# Patient Record
Sex: FEMALE | Race: WHITE | Employment: FULL TIME | URBAN - METROPOLITAN AREA
[De-identification: names, ages, dates, MRNs, and addresses within clinical notes are randomized per-mention and may not be internally consistent; named-entity substitution may affect disease eponyms.]

---

## 2018-10-24 ENCOUNTER — HOSPITAL ENCOUNTER (OUTPATIENT)
Dept: LAB | Age: 38
Discharge: HOME | End: 2018-10-24
Payer: COMMERCIAL

## 2018-10-24 DIAGNOSIS — D64.9 ANEMIA, UNSPECIFIED TYPE: ICD-10-CM

## 2018-10-24 PROBLEM — D50.9 IDA (IRON DEFICIENCY ANEMIA): Status: ACTIVE | Noted: 2018-10-24

## 2018-10-24 LAB
BASOPHILS # BLD: 0 K/UL (ref 0–0.2)
BASOPHILS NFR BLD: 1 % (ref 0–2)
DIFFERENTIAL METHOD BLD: ABNORMAL
EOSINOPHIL # BLD: 0.2 K/UL (ref 0–0.8)
EOSINOPHIL NFR BLD: 2 % (ref 0.5–7.8)
ERYTHROCYTE [DISTWIDTH] IN BLOOD BY AUTOMATED COUNT: 18.1 % (ref 11.9–14.6)
FERRITIN SERPL-MCNC: 4 NG/ML (ref 8–388)
HCT VFR BLD AUTO: 35.6 % (ref 35.8–46.3)
HGB BLD-MCNC: 10.8 G/DL (ref 11.7–15.4)
HGB RETIC QN AUTO: 28 PG (ref 29–35)
IMM GRANULOCYTES # BLD: 0 K/UL (ref 0–0.5)
IMM GRANULOCYTES NFR BLD AUTO: 1 % (ref 0–5)
IMM RETICS NFR: 10.4 % (ref 3–15.9)
IRON SATN MFR SERPL: 7 %
IRON SERPL-MCNC: 33 UG/DL (ref 35–150)
LYMPHOCYTES # BLD: 1.9 K/UL (ref 0.5–4.6)
LYMPHOCYTES NFR BLD: 29 % (ref 13–44)
MCH RBC QN AUTO: 23.2 PG (ref 26.1–32.9)
MCHC RBC AUTO-ENTMCNC: 30.3 G/DL (ref 31.4–35)
MCV RBC AUTO: 76.6 FL (ref 79.6–97.8)
MONOCYTES # BLD: 0.4 K/UL (ref 0.1–1.3)
MONOCYTES NFR BLD: 6 % (ref 4–12)
NEUTS SEG # BLD: 4 K/UL (ref 1.7–8.2)
NEUTS SEG NFR BLD: 61 % (ref 43–78)
NRBC # BLD: 0 K/UL (ref 0–0.2)
PLATELET # BLD AUTO: 301 K/UL (ref 150–450)
PMV BLD AUTO: 11 FL (ref 9.4–12.3)
RBC # BLD AUTO: 4.65 M/UL (ref 4.05–5.25)
RETICS # AUTO: 0.04 M/UL (ref 0.03–0.1)
RETICS/RBC NFR AUTO: 0.8 % (ref 0.3–2)
TIBC SERPL-MCNC: 476 UG/DL (ref 250–450)
WBC # BLD AUTO: 6.6 K/UL (ref 4.3–11.1)

## 2018-10-24 PROCEDURE — 36415 COLL VENOUS BLD VENIPUNCTURE: CPT

## 2018-10-24 PROCEDURE — 83540 ASSAY OF IRON: CPT

## 2018-10-24 PROCEDURE — 85046 RETICYTE/HGB CONCENTRATE: CPT

## 2018-10-24 PROCEDURE — 85025 COMPLETE CBC W/AUTO DIFF WBC: CPT

## 2018-10-24 PROCEDURE — 82728 ASSAY OF FERRITIN: CPT

## 2018-11-02 ENCOUNTER — APPOINTMENT (OUTPATIENT)
Dept: INFUSION THERAPY | Age: 38
End: 2018-11-02

## 2018-11-07 ENCOUNTER — DOCUMENTATION ONLY (OUTPATIENT)
Dept: HEMATOLOGY | Age: 38
End: 2018-11-07

## 2018-11-07 ENCOUNTER — HOSPITAL ENCOUNTER (OUTPATIENT)
Dept: INFUSION THERAPY | Age: 38
Discharge: HOME | End: 2018-11-07
Payer: COMMERCIAL

## 2018-11-07 VITALS
OXYGEN SATURATION: 96 % | BODY MASS INDEX: 52.98 KG/M2 | TEMPERATURE: 98.1 F | HEART RATE: 120 BPM | RESPIRATION RATE: 18 BRPM | DIASTOLIC BLOOD PRESSURE: 98 MMHG | SYSTOLIC BLOOD PRESSURE: 157 MMHG | WEIGHT: 293 LBS

## 2018-11-07 DIAGNOSIS — D50.9 IRON DEFICIENCY ANEMIA, UNSPECIFIED IRON DEFICIENCY ANEMIA TYPE: Primary | ICD-10-CM

## 2018-11-07 PROCEDURE — 96365 THER/PROPH/DIAG IV INF INIT: CPT

## 2018-11-07 PROCEDURE — 74011250636 HC RX REV CODE- 250/636: Performed by: INTERNAL MEDICINE

## 2018-11-07 RX ORDER — DIPHENHYDRAMINE HYDROCHLORIDE 50 MG/ML
50 INJECTION, SOLUTION INTRAMUSCULAR; INTRAVENOUS AS NEEDED
Status: ACTIVE | OUTPATIENT
Start: 2018-11-07 | End: 2018-11-08

## 2018-11-07 RX ORDER — SODIUM CHLORIDE 0.9 % (FLUSH) 0.9 %
10 SYRINGE (ML) INJECTION AS NEEDED
Status: ACTIVE | OUTPATIENT
Start: 2018-11-07 | End: 2018-11-08

## 2018-11-07 RX ORDER — ACETAMINOPHEN 325 MG/1
650 TABLET ORAL AS NEEDED
Status: ACTIVE | OUTPATIENT
Start: 2018-11-07 | End: 2018-11-08

## 2018-11-07 RX ADMIN — SODIUM CHLORIDE 500 ML: 900 INJECTION, SOLUTION INTRAVENOUS at 14:10

## 2018-11-07 RX ADMIN — FERRIC CARBOXYMALTOSE INJECTION 750 MG: 50 INJECTION, SOLUTION INTRAVENOUS at 14:24

## 2018-11-07 NOTE — PROGRESS NOTES
Arrived to the [facility]. Injectafer and hydration completed. Patient tolerated without problems. Any issues or concerns during appointment: None. Patient aware of next infusion appointment on 11/14/2018 at 13:15. Discharged ambulatory to home.

## 2018-11-07 NOTE — PROGRESS NOTES
I spoke with Mrs. Justice regarding the [facility] co-pay program with [facility]. I went over the program, enrollment, and benefits of the program.  She agreed to allow me to enroll her on her behalf and signed the LPOA document. Enrollment information and LPOA will be scanned into chart.

## 2018-11-09 ENCOUNTER — APPOINTMENT (OUTPATIENT)
Dept: INFUSION THERAPY | Age: 38
End: 2018-11-09

## 2018-11-14 ENCOUNTER — HOSPITAL ENCOUNTER (OUTPATIENT)
Dept: INFUSION THERAPY | Age: 38
Discharge: HOME | End: 2018-11-14
Payer: COMMERCIAL

## 2018-11-14 VITALS
OXYGEN SATURATION: 99 % | DIASTOLIC BLOOD PRESSURE: 89 MMHG | HEART RATE: 81 BPM | SYSTOLIC BLOOD PRESSURE: 142 MMHG | TEMPERATURE: 98.6 F | BODY MASS INDEX: 53.02 KG/M2 | WEIGHT: 293 LBS | RESPIRATION RATE: 16 BRPM

## 2018-11-14 DIAGNOSIS — D50.9 IRON DEFICIENCY ANEMIA, UNSPECIFIED IRON DEFICIENCY ANEMIA TYPE: Primary | ICD-10-CM

## 2018-11-14 PROCEDURE — 74011250636 HC RX REV CODE- 250/636: Performed by: INTERNAL MEDICINE

## 2018-11-14 PROCEDURE — 96365 THER/PROPH/DIAG IV INF INIT: CPT

## 2018-11-14 RX ORDER — SODIUM CHLORIDE 0.9 % (FLUSH) 0.9 %
10 SYRINGE (ML) INJECTION AS NEEDED
Status: ACTIVE | OUTPATIENT
Start: 2018-11-14 | End: 2018-11-15

## 2018-11-14 RX ADMIN — FERRIC CARBOXYMALTOSE INJECTION 750 MG: 50 INJECTION, SOLUTION INTRAVENOUS at 13:55

## 2018-11-14 RX ADMIN — SODIUM CHLORIDE 500 ML: 900 INJECTION, SOLUTION INTRAVENOUS at 13:50

## 2018-11-14 RX ADMIN — Medication 10 ML: at 14:36

## 2018-11-22 NOTE — PROGRESS NOTES
Arrived to the [facility] license of [facility]. Injectafer completed. Patient tolerated well. Any issues or concerns during appointment: none. No future infusion appointments at this time. Discharged ambulatory.  
 
Crystal Macdonald RN

## 2018-12-27 ENCOUNTER — ANESTHESIA EVENT (OUTPATIENT)
Dept: ENDOSCOPY | Age: 38
End: 2018-12-27
Payer: COMMERCIAL

## 2018-12-28 ENCOUNTER — HOSPITAL ENCOUNTER (OUTPATIENT)
Age: 38
Setting detail: OUTPATIENT SURGERY
Discharge: HOME | End: 2018-12-28
Attending: INTERNAL MEDICINE | Admitting: INTERNAL MEDICINE
Payer: COMMERCIAL

## 2018-12-28 ENCOUNTER — ANESTHESIA (OUTPATIENT)
Dept: ENDOSCOPY | Age: 38
End: 2018-12-28
Payer: COMMERCIAL

## 2018-12-28 VITALS
RESPIRATION RATE: 14 BRPM | HEART RATE: 60 BPM | HEIGHT: 67 IN | SYSTOLIC BLOOD PRESSURE: 129 MMHG | WEIGHT: 293 LBS | BODY MASS INDEX: 45.99 KG/M2 | DIASTOLIC BLOOD PRESSURE: 63 MMHG | TEMPERATURE: 97 F | OXYGEN SATURATION: 100 %

## 2018-12-28 PROCEDURE — 77030009426 HC FCPS BIOP ENDOSC BSC -B: Performed by: INTERNAL MEDICINE

## 2018-12-28 PROCEDURE — 88305 TISSUE EXAM BY PATHOLOGIST: CPT

## 2018-12-28 PROCEDURE — 74011250636 HC RX REV CODE- 250/636: Performed by: ANESTHESIOLOGY

## 2018-12-28 PROCEDURE — 76060000032 HC ANESTHESIA 0.5 TO 1 HR: Performed by: INTERNAL MEDICINE

## 2018-12-28 PROCEDURE — 76040000025: Performed by: INTERNAL MEDICINE

## 2018-12-28 PROCEDURE — 77030013991 HC SNR POLYP ENDOSC BSC -A: Performed by: INTERNAL MEDICINE

## 2018-12-28 PROCEDURE — 74011250636 HC RX REV CODE- 250/636

## 2018-12-28 RX ORDER — SODIUM CHLORIDE, SODIUM LACTATE, POTASSIUM CHLORIDE, CALCIUM CHLORIDE 600; 310; 30; 20 MG/100ML; MG/100ML; MG/100ML; MG/100ML
100 INJECTION, SOLUTION INTRAVENOUS CONTINUOUS
Status: DISCONTINUED | OUTPATIENT
Start: 2018-12-28 | End: 2018-12-28 | Stop reason: HOSPADM

## 2018-12-28 RX ORDER — PROPOFOL 10 MG/ML
INJECTION, EMULSION INTRAVENOUS AS NEEDED
Status: DISCONTINUED | OUTPATIENT
Start: 2018-12-28 | End: 2018-12-28 | Stop reason: HOSPADM

## 2018-12-28 RX ORDER — LIDOCAINE HYDROCHLORIDE 20 MG/ML
INJECTION, SOLUTION EPIDURAL; INFILTRATION; INTRACAUDAL; PERINEURAL AS NEEDED
Status: DISCONTINUED | OUTPATIENT
Start: 2018-12-28 | End: 2018-12-28 | Stop reason: HOSPADM

## 2018-12-28 RX ORDER — PROPOFOL 10 MG/ML
INJECTION, EMULSION INTRAVENOUS
Status: DISCONTINUED | OUTPATIENT
Start: 2018-12-28 | End: 2018-12-28 | Stop reason: HOSPADM

## 2018-12-28 RX ADMIN — PROPOFOL 160 MCG/KG/MIN: 10 INJECTION, EMULSION INTRAVENOUS at 13:44

## 2018-12-28 RX ADMIN — PROPOFOL 30 MG: 10 INJECTION, EMULSION INTRAVENOUS at 13:46

## 2018-12-28 RX ADMIN — PROPOFOL 40 MG: 10 INJECTION, EMULSION INTRAVENOUS at 13:48

## 2018-12-28 RX ADMIN — PROPOFOL 50 MG: 10 INJECTION, EMULSION INTRAVENOUS at 13:44

## 2018-12-28 RX ADMIN — LIDOCAINE HYDROCHLORIDE 60 MG: 20 INJECTION, SOLUTION EPIDURAL; INFILTRATION; INTRACAUDAL; PERINEURAL at 13:42

## 2018-12-28 RX ADMIN — PROPOFOL 30 MG: 10 INJECTION, EMULSION INTRAVENOUS at 13:50

## 2018-12-28 RX ADMIN — SODIUM CHLORIDE, SODIUM LACTATE, POTASSIUM CHLORIDE, AND CALCIUM CHLORIDE 100 ML/HR: 600; 310; 30; 20 INJECTION, SOLUTION INTRAVENOUS at 13:33

## 2018-12-28 NOTE — PROCEDURES
Colonoscopy Procedure Note    Indications: BRENDON, PMH polyps, [facility] polyps/ CRC    Anesthesia/Sedation: MAC IV     Pre-Procedure Physical:  Current Facility-Administered Medications   Medication Dose Route Frequency    lactated Ringers infusion  100 mL/hr IntraVENous CONTINUOUS     Facility-Administered Medications Ordered in Other Encounters   Medication Dose Route Frequency    propofol (DIPRIVAN) 10 mg/mL injection    CONTINUOUS    propofol (DIPRIVAN) 10 mg/mL injection    PRN    lidocaine (PF) (XYLOCAINE) 20 mg/mL (2 %) injection   IntraVENous PRN      Latex; Whey; Nsaids (non-steroidal anti-inflammatory drug); Soy; and Zosyn [piperacillin-tazobactam]    Patient Vitals for the past 8 hrs:   BP Temp Pulse Resp SpO2 Height Weight   12/28/18 1244 156/85  66 18 100 %     12/28/18 1226  99 °F (37.2 °C)    5' 6.5\" (1.689 m) 141.5 kg (312 lb)       Exam:    Airway: clear   Heart: normal S1and S2    Lungs: clear bilateral  Abdomen: soft, nontender, bowel sounds present and normal in all quads   Mental Status: awake, alert and oriented to person, place and time        Procedure Details      Informed consent was obtained for the procedure, including sedation. Risks of perforation, hemorrhage, adverse drug reaction and aspiration were discussed. The patient was placed in the left lateral decubitus position. Based on the pre-procedure assessment, including review of the patient's medical history, medications, allergies, and review of systems, she had been deemed to be an appropriate candidate for conscious sedation; she was therefore sedated with the medications listed below. The patient was monitored continuously with ECG tracing, pulse oximetry, blood pressure monitoring, and direct observations. A rectal examination was performed. The colonoscope was inserted into the rectum and advanced under direct vision to the cecum. The quality of the colonic preparation was good.  A careful inspection was made as the colonoscope was withdrawn, including a retroflexed view of the rectum; findings and interventions are described below. Appropriate photodocumentation was obtained. Findings:   Single 9 mm sessile polyp removed by hot snare from the transverse colon. Single 7 mm sessile polyp removed by cold snare from the sigmoid colon. Specimens: Polyps    Estimated Blood Loss: 0 cc           Complications: None; patient tolerated the procedure well. Attending Attestation: I performed the procedure. Impression:    Single 9 mm sessile polyp removed by hot snare from the transverse colon. Single 7 mm sessile polyp removed by cold snare from the sigmoid colon. Recommendations:   Repeat colonoscopy in 2 years.

## 2018-12-28 NOTE — PROCEDURES
Endoscopic Gastroduodenoscopy Procedure Note    Indications: BRENDON, gastric bypass    Anesthesia/Sedation: MAC IV     Pre-Procedure Physical:    Current Facility-Administered Medications   Medication Dose Route Frequency    lactated Ringers infusion  100 mL/hr IntraVENous CONTINUOUS      Latex; Whey; Nsaids (non-steroidal anti-inflammatory drug); Soy; and Zosyn [piperacillin-tazobactam]    Patient Vitals for the past 8 hrs:   BP Temp Pulse Resp SpO2 Height Weight   12/28/18 1244 156/85  66 18 100 %     12/28/18 1226  99 °F (37.2 °C)    5' 6.5\" (1.689 m) 141.5 kg (312 lb)       Exam      Airway: clear   Heart: normal S1and S2    Lungs: clear bilateral  Abdomen: soft, nontender, bowel sounds present and normal in all quads   Mental Status: awake, alert and oriented to person, place and time          Procedure Details     Informed consent was obtained for the procedure, including conscious sedation. Risks of pancreatitis, infection, perforation, hemorrhage, adverse drug reaction and aspiration were discussed. The patient was placed in the left lateral decubitus position. Based on the pre-procedure assessment, including review of the patient's medical history, medications, allergies, and review of systems, she had been deemed to be an appropriate candidate for conscious sedation; she was therefore sedated with the medications listed below. She was monitored continuously with ECG tracing, pulse oximetry, blood pressure monitoring, and direct observation. The EGD gastroscope was inserted into the mouth and advanced under direct vision to the second portion of the duodenum. A careful inspection was made as the gastroscope was withdrawn, including a retroflexed view of the proximal stomach; findings and interventions are described below. Appropriate photodocumentation was obtained. Findings:   Esophagus- Normal.  Stomach- Gastric bypass. Jejunum- Biopsied.     Therapies: Biopsy    Specimens: Duodenal    Estimated Blood Loss: 0 cc           Complications:   None; patient tolerated the procedure well. Attending Attestation:  I performed the procedure. Impression:    Gastric bypass. Random duodenal biopsies taken. Recommendations:  Await path results.

## 2018-12-28 NOTE — ANESTHESIA PREPROCEDURE EVALUATION
Anesthetic History PONV Review of Systems / Medical History Patient summary reviewed and pertinent labs reviewed Pulmonary Within defined limits Neuro/Psych Within defined limits Cardiovascular Within defined limits Exercise tolerance: >4 METS 
  
GI/Hepatic/Renal 
Within defined limits Endo/Other Morbid obesity and anemia Other Findings Physical Exam 
 
Airway Mallampati: II 
TM Distance: 4 - 6 cm Neck ROM: normal range of motion Mouth opening: Normal 
 
 Cardiovascular Regular rate and rhythm,  S1 and S2 normal,  no murmur, click, rub, or gallop Dental 
 
 
  
Pulmonary Breath sounds clear to auscultation Abdominal 
 
 
 
 Other Findings Anesthetic Plan ASA: 3 Anesthesia type: total IV anesthesia Induction: Intravenous Anesthetic plan and risks discussed with: Patient

## 2018-12-28 NOTE — H&P
[facility] Consult Note Referring Physician:  
 
Consult Date: 12/28/2018 Reason for Consult: BRENDON, gastric bypass, PMH polyps, [facility] polyps/ CRC History of Present Illness:  Patient is a 34 y.o. female who is seen in consultation for BRENDON, gastric bypass, PMH polyps, [facility] polyps/ CRC. Past Medical History:  
Diagnosis Date  Anemia  Endometriosis  Nausea & vomiting Past Surgical History:  
Procedure Laterality Date  HX CHOLECYSTECTOMY  2006  HX GYN    
 HX TONSILLECTOMY  2000 Family History Problem Relation Age of Onset  Hypertension Mother  Hypertension Father  Cancer Maternal Aunt Social History Occupational History  Not on file Tobacco Use  Smoking status: Never Smoker  Smokeless tobacco: Never Used Substance and Sexual Activity  Alcohol use: No  
  Frequency: Never  Drug use: No  
 Sexual activity: Not on file Hospital Medications: No current facility-administered medications for this encounter. Allergies: Allergies Allergen Reactions  Latex Contact Dermatitis  Whey Shortness of Breath  Nsaids (Non-Steroidal Anti-Inflammatory Drug) Other (comments) Weight loss surgery  Soy Rash  Zosyn [Piperacillin-Tazobactam] Rash Review of Systems: A comprehensive review of systems was negative except for that written in the History of Present Illness. Objective:  
 
Physical Exam: 
Vitals: 
Visit Vitals /85 Pulse 66 Temp 99 °F (37.2 °C) Resp 18 Ht 5' 6.5\" (1.689 m) Wt 141.5 kg (312 lb) SpO2 100% BMI 49.60 kg/m² General: No acute distress. Skin:  Extremities and face reveal no rashes. No hurley erythema. No telangiectasias on the chest wall. HEENT: Sclerae anicteric. No oral ulcers. No abnormal pigmentation of the lips. The neck is supple. Cardiovascular: Regular rate and rhythm. No murmurs, gallops, or rubs. Respiratory:  Comfortable breathing  With no accessory muscle use. Clear breath sounds with no wheezes, rales, or rhonchi. GI:  Abdomen nondistended, soft, and nontender. Normal active bowel sounds. No enlargement of the liver or spleen. No masses palpable. Musculoskeletal:  No pitting edema of the lower legs. Extremities have good range of motion. Neurological:  Gross memory appears intact. Patient is alert and oriented. Psychiatric:  Mood appears appropriate with judgement intact. Lymphatic:  No cervical or supraclavicular adenopathy. Laboratory: No results for input(s): WBC, RBC, HGB, HCT, PLT, HGBEXT, HCTEXT, PLTEXT in the last 72 hours. No results for input(s): GLU, NA, K, CL, CO2, BUN, CREA, CA in the last 72 hours. No results for input(s): PTP, INR, APTT in the last 72 hours. No lab exists for component: INREXT No results for input(s): TBIL, CBIL, SGOT, GPT, AP, ALB, TP, AML, LPSE in the last 72 hours. Assessment: A 34 y.o. female with BRENDON, gastric bypass, [facility] polyps, [facility] polyps/ CRC Plan: 
  
 
EGD and colonoscopy Signed By: Lisa Damon MD   
 December 28, 2018

## 2018-12-28 NOTE — ANESTHESIA POSTPROCEDURE EVALUATION
Procedure(s): ESOPHAGOGASTRODUODENOSCOPY (EGD) COLONOSCOPY/ 51 
ESOPHAGOGASTRODUODENAL (EGD) BIOPSY ENDOSCOPIC POLYPECTOMY. Anesthesia Post Evaluation Multimodal analgesia: multimodal analgesia not used between 6 hours prior to anesthesia start to PACU discharge Patient location during evaluation: bedside Patient participation: complete - patient participated Level of consciousness: awake and alert Pain score: 3 Pain management: adequate Airway patency: patent Anesthetic complications: no 
Cardiovascular status: acceptable and hemodynamically stable Respiratory status: acceptable Hydration status: acceptable Visit Vitals /63 Pulse 60 Temp 36.1 °C (97 °F) Resp 14 Ht 5' 6.5\" (1.689 m) Wt 141.5 kg (312 lb) SpO2 100% BMI 49.60 kg/m²

## 2018-12-28 NOTE — DISCHARGE INSTRUCTIONS
Gastrointestinal Esophagogastroduodenoscopy (EGD) - Upper Exam Discharge Instructions    1. Call Dr. Rosie Lira at [phone] for any problems or questions. 2. Contact the doctor's office for follow up appointment as directed. 3. Medication may cause drowsiness for several hours, therefore, do not drive or operate machinery for remainder of the day. 4. No alcohol today. 5. Ordinarily, you may resume regular diet and activity after exam unless otherwise specified by your physician. 6. For mild soreness in your throat you may use Cepacol throat lozenges or warm  salt-water gargles as needed. Gastrointestinal Colonoscopy/Flexible Sigmoidoscopy - Lower Exam Discharge Instructions  1. Because of air put into your colon during exam, you may experience some abdominal distension, relieved by the passage of gas, for several hours. 2. Contact your physician if you have any of the following:  a. Excessive amount of bleeding - large amount when having a bowel movement. Occasional specks of blood with bowel movement would not be unusual.  b. Severe abdominal pain  c.  Fever or Chills    Any additional instructions:  Repeat colonoscopy in 2 years

## 2018-12-28 NOTE — ROUTINE PROCESS
VSS. No complaints noted. Education reviewed and signed with patient's . Pt discharged via wheelchair [facility].

## 2019-02-15 ENCOUNTER — HOSPITAL ENCOUNTER (OUTPATIENT)
Dept: LAB | Age: 39
Discharge: HOME | End: 2019-02-15
Payer: COMMERCIAL

## 2019-02-15 DIAGNOSIS — D50.9 IRON DEFICIENCY ANEMIA, UNSPECIFIED IRON DEFICIENCY ANEMIA TYPE: ICD-10-CM

## 2019-02-15 LAB
ALBUMIN SERPL-MCNC: 3.9 G/DL (ref 3.5–5)
ALBUMIN/GLOB SERPL: 1.1 {RATIO} (ref 1.2–3.5)
ALP SERPL-CCNC: 124 U/L (ref 50–136)
ALT SERPL-CCNC: 46 U/L (ref 12–65)
ANION GAP SERPL CALC-SCNC: 4 MMOL/L (ref 7–16)
AST SERPL-CCNC: 34 U/L (ref 15–37)
BASOPHILS # BLD: 0 K/UL (ref 0–0.2)
BASOPHILS NFR BLD: 1 % (ref 0–2)
BILIRUB SERPL-MCNC: 0.3 MG/DL (ref 0.2–1.1)
BUN SERPL-MCNC: 9 MG/DL (ref 6–23)
CALCIUM SERPL-MCNC: 8.6 MG/DL (ref 8.3–10.4)
CHLORIDE SERPL-SCNC: 110 MMOL/L (ref 98–107)
CO2 SERPL-SCNC: 26 MMOL/L (ref 21–32)
CREAT SERPL-MCNC: 0.78 MG/DL (ref 0.6–1)
DIFFERENTIAL METHOD BLD: ABNORMAL
EOSINOPHIL # BLD: 0.1 K/UL (ref 0–0.8)
EOSINOPHIL NFR BLD: 3 % (ref 0.5–7.8)
ERYTHROCYTE [DISTWIDTH] IN BLOOD BY AUTOMATED COUNT: 15.5 % (ref 11.9–14.6)
FERRITIN SERPL-MCNC: 39 NG/ML (ref 8–388)
GLOBULIN SER CALC-MCNC: 3.6 G/DL (ref 2.3–3.5)
GLUCOSE SERPL-MCNC: 80 MG/DL (ref 65–100)
HCT VFR BLD AUTO: 39.7 % (ref 35.8–46.3)
HGB BLD-MCNC: 13.1 G/DL (ref 11.7–15.4)
IMM GRANULOCYTES # BLD AUTO: 0 K/UL (ref 0–0.5)
IMM GRANULOCYTES NFR BLD AUTO: 0 % (ref 0–5)
IRON SATN MFR SERPL: 23 %
IRON SERPL-MCNC: 75 UG/DL (ref 35–150)
LYMPHOCYTES # BLD: 1.2 K/UL (ref 0.5–4.6)
LYMPHOCYTES NFR BLD: 22 % (ref 13–44)
MCH RBC QN AUTO: 29.4 PG (ref 26.1–32.9)
MCHC RBC AUTO-ENTMCNC: 33 G/DL (ref 31.4–35)
MCV RBC AUTO: 89 FL (ref 79.6–97.8)
MONOCYTES # BLD: 0.5 K/UL (ref 0.1–1.3)
MONOCYTES NFR BLD: 9 % (ref 4–12)
NEUTS SEG # BLD: 3.5 K/UL (ref 1.7–8.2)
NEUTS SEG NFR BLD: 66 % (ref 43–78)
NRBC # BLD: 0 K/UL (ref 0–0.2)
PLATELET # BLD AUTO: 176 K/UL (ref 150–450)
PMV BLD AUTO: 11 FL (ref 9.4–12.3)
POTASSIUM SERPL-SCNC: 3.9 MMOL/L (ref 3.5–5.1)
PROT SERPL-MCNC: 7.5 G/DL (ref 6.3–8.2)
RBC # BLD AUTO: 4.46 M/UL (ref 4.05–5.25)
SODIUM SERPL-SCNC: 140 MMOL/L (ref 136–145)
TIBC SERPL-MCNC: 327 UG/DL (ref 250–450)
WBC # BLD AUTO: 5.3 K/UL (ref 4.3–11.1)

## 2019-02-15 PROCEDURE — 83540 ASSAY OF IRON: CPT

## 2019-02-15 PROCEDURE — 80053 COMPREHEN METABOLIC PANEL: CPT

## 2019-02-15 PROCEDURE — 82728 ASSAY OF FERRITIN: CPT

## 2019-02-15 PROCEDURE — 85025 COMPLETE CBC W/AUTO DIFF WBC: CPT

## 2019-02-15 PROCEDURE — 36415 COLL VENOUS BLD VENIPUNCTURE: CPT

## 2021-11-17 ENCOUNTER — ANESTHESIA EVENT (OUTPATIENT)
Dept: ENDOSCOPY | Age: 41
End: 2021-11-17
Payer: COMMERCIAL

## 2021-11-17 RX ORDER — SODIUM CHLORIDE, SODIUM LACTATE, POTASSIUM CHLORIDE, CALCIUM CHLORIDE 600; 310; 30; 20 MG/100ML; MG/100ML; MG/100ML; MG/100ML
100 INJECTION, SOLUTION INTRAVENOUS CONTINUOUS
Status: CANCELLED | OUTPATIENT
Start: 2021-11-17

## 2021-11-17 RX ORDER — HYDROMORPHONE HYDROCHLORIDE 1 MG/ML
0.5 INJECTION, SOLUTION INTRAMUSCULAR; INTRAVENOUS; SUBCUTANEOUS
Status: CANCELLED | OUTPATIENT
Start: 2021-11-17

## 2021-11-17 RX ORDER — ONDANSETRON 2 MG/ML
4 INJECTION INTRAMUSCULAR; INTRAVENOUS ONCE
Status: CANCELLED | OUTPATIENT
Start: 2021-11-17 | End: 2021-11-17

## 2021-11-17 RX ORDER — OXYCODONE HYDROCHLORIDE 5 MG/1
5 TABLET ORAL
Status: CANCELLED | OUTPATIENT
Start: 2021-11-17 | End: 2021-11-18

## 2021-11-17 RX ORDER — OXYCODONE HYDROCHLORIDE 5 MG/1
10 TABLET ORAL
Status: CANCELLED | OUTPATIENT
Start: 2021-11-17 | End: 2021-11-18

## 2021-11-17 RX ORDER — DIPHENHYDRAMINE HYDROCHLORIDE 50 MG/ML
12.5 INJECTION, SOLUTION INTRAMUSCULAR; INTRAVENOUS
Status: CANCELLED | OUTPATIENT
Start: 2021-11-17 | End: 2021-11-18

## 2021-11-17 RX ORDER — ALBUTEROL SULFATE 0.83 MG/ML
2.5 SOLUTION RESPIRATORY (INHALATION) AS NEEDED
Status: CANCELLED | OUTPATIENT
Start: 2021-11-17

## 2021-11-17 RX ORDER — NALOXONE HYDROCHLORIDE 0.4 MG/ML
0.1 INJECTION, SOLUTION INTRAMUSCULAR; INTRAVENOUS; SUBCUTANEOUS AS NEEDED
Status: CANCELLED | OUTPATIENT
Start: 2021-11-17

## 2021-11-19 ENCOUNTER — HOSPITAL ENCOUNTER (OUTPATIENT)
Age: 41
Setting detail: OUTPATIENT SURGERY
Discharge: HOME | End: 2021-11-19
Attending: INTERNAL MEDICINE | Admitting: INTERNAL MEDICINE
Payer: COMMERCIAL

## 2021-11-19 ENCOUNTER — ANESTHESIA (OUTPATIENT)
Dept: ENDOSCOPY | Age: 41
End: 2021-11-19
Payer: COMMERCIAL

## 2021-11-19 VITALS
HEART RATE: 58 BPM | OXYGEN SATURATION: 100 % | TEMPERATURE: 97.7 F | RESPIRATION RATE: 16 BRPM | SYSTOLIC BLOOD PRESSURE: 150 MMHG | DIASTOLIC BLOOD PRESSURE: 67 MMHG

## 2021-11-19 LAB — HCG UR QL: NEGATIVE

## 2021-11-19 PROCEDURE — 88312 SPECIAL STAINS GROUP 1: CPT

## 2021-11-19 PROCEDURE — 76060000032 HC ANESTHESIA 0.5 TO 1 HR: Performed by: INTERNAL MEDICINE

## 2021-11-19 PROCEDURE — 76040000025: Performed by: INTERNAL MEDICINE

## 2021-11-19 PROCEDURE — 2709999900 HC NON-CHARGEABLE SUPPLY: Performed by: INTERNAL MEDICINE

## 2021-11-19 PROCEDURE — 74011250636 HC RX REV CODE- 250/636: Performed by: ANESTHESIOLOGY

## 2021-11-19 PROCEDURE — 74011250636 HC RX REV CODE- 250/636: Performed by: REGISTERED NURSE

## 2021-11-19 PROCEDURE — 74011000250 HC RX REV CODE- 250: Performed by: REGISTERED NURSE

## 2021-11-19 PROCEDURE — 88305 TISSUE EXAM BY PATHOLOGIST: CPT

## 2021-11-19 PROCEDURE — 77030021593 HC FCPS BIOP ENDOSC BSC -A: Performed by: INTERNAL MEDICINE

## 2021-11-19 PROCEDURE — 81025 URINE PREGNANCY TEST: CPT

## 2021-11-19 RX ORDER — PROPOFOL 10 MG/ML
INJECTION, EMULSION INTRAVENOUS AS NEEDED
Status: DISCONTINUED | OUTPATIENT
Start: 2021-11-19 | End: 2021-11-19 | Stop reason: HOSPADM

## 2021-11-19 RX ORDER — MIDAZOLAM HYDROCHLORIDE 1 MG/ML
2 INJECTION, SOLUTION INTRAMUSCULAR; INTRAVENOUS
Status: DISCONTINUED | OUTPATIENT
Start: 2021-11-19 | End: 2021-11-19 | Stop reason: HOSPADM

## 2021-11-19 RX ORDER — PROPOFOL 10 MG/ML
INJECTION, EMULSION INTRAVENOUS
Status: DISCONTINUED | OUTPATIENT
Start: 2021-11-19 | End: 2021-11-19 | Stop reason: HOSPADM

## 2021-11-19 RX ORDER — FENTANYL CITRATE 50 UG/ML
100 INJECTION, SOLUTION INTRAMUSCULAR; INTRAVENOUS ONCE
Status: DISCONTINUED | OUTPATIENT
Start: 2021-11-19 | End: 2021-11-19 | Stop reason: HOSPADM

## 2021-11-19 RX ORDER — SODIUM CHLORIDE, SODIUM LACTATE, POTASSIUM CHLORIDE, CALCIUM CHLORIDE 600; 310; 30; 20 MG/100ML; MG/100ML; MG/100ML; MG/100ML
100 INJECTION, SOLUTION INTRAVENOUS CONTINUOUS
Status: DISCONTINUED | OUTPATIENT
Start: 2021-11-19 | End: 2021-11-19 | Stop reason: HOSPADM

## 2021-11-19 RX ORDER — LIDOCAINE HYDROCHLORIDE 10 MG/ML
0.1 INJECTION INFILTRATION; PERINEURAL AS NEEDED
Status: DISCONTINUED | OUTPATIENT
Start: 2021-11-19 | End: 2021-11-19 | Stop reason: HOSPADM

## 2021-11-19 RX ORDER — MIDAZOLAM HYDROCHLORIDE 1 MG/ML
2 INJECTION, SOLUTION INTRAMUSCULAR; INTRAVENOUS ONCE
Status: DISCONTINUED | OUTPATIENT
Start: 2021-11-19 | End: 2021-11-19 | Stop reason: HOSPADM

## 2021-11-19 RX ORDER — LIDOCAINE HYDROCHLORIDE 20 MG/ML
INJECTION, SOLUTION EPIDURAL; INFILTRATION; INTRACAUDAL; PERINEURAL AS NEEDED
Status: DISCONTINUED | OUTPATIENT
Start: 2021-11-19 | End: 2021-11-19 | Stop reason: HOSPADM

## 2021-11-19 RX ADMIN — PROPOFOL 20 MG: 10 INJECTION, EMULSION INTRAVENOUS at 09:19

## 2021-11-19 RX ADMIN — LIDOCAINE HYDROCHLORIDE 100 MG: 20 INJECTION, SOLUTION EPIDURAL; INFILTRATION; INTRACAUDAL; PERINEURAL at 09:15

## 2021-11-19 RX ADMIN — PROPOFOL 150 MCG/KG/MIN: 10 INJECTION, EMULSION INTRAVENOUS at 09:17

## 2021-11-19 RX ADMIN — PROPOFOL 20 MG: 10 INJECTION, EMULSION INTRAVENOUS at 09:18

## 2021-11-19 RX ADMIN — SODIUM CHLORIDE, SODIUM LACTATE, POTASSIUM CHLORIDE, AND CALCIUM CHLORIDE 100 ML/HR: 600; 310; 30; 20 INJECTION, SOLUTION INTRAVENOUS at 08:44

## 2021-11-19 RX ADMIN — PROPOFOL 60 MG: 10 INJECTION, EMULSION INTRAVENOUS at 09:17

## 2021-11-19 NOTE — ANESTHESIA POSTPROCEDURE EVALUATION
Procedure(s):  ESOPHAGOGASTRODUODENOSCOPY (EGD) WITH PUSH/ 50 *Pt bringing COVID results from [facility]  ESOPHAGOGASTRODUODENAL (EGD) BIOPSY. total IV anesthesia    Anesthesia Post Evaluation      Multimodal analgesia: multimodal analgesia used between 6 hours prior to anesthesia start to PACU discharge  Patient location during evaluation: PACU  Patient participation: complete - patient participated  Level of consciousness: awake and awake and alert  Pain management: adequate  Airway patency: patent  Anesthetic complications: no  Cardiovascular status: acceptable  Respiratory status: acceptable  Hydration status: acceptable  Post anesthesia nausea and vomiting:  controlled      INITIAL Post-op Vital signs:   Vitals Value Taken Time   /81 11/19/21 1009   Temp 36.5 °C (97.7 °F) 11/19/21 0949   Pulse 58 11/19/21 1012   Resp 16 11/19/21 0959   SpO2 100 % 11/19/21 1012   Vitals shown include unvalidated device data.

## 2021-11-19 NOTE — H&P
Preoperative H&P    Patient is followed by Dr. Etienne Cortez. She was seen by Juliane Maynard PA-C recently for nausea and abnormal CT with concerns for transient intussusception. Patient is here today for a push enteroscopy. Visit Vitals  BP (!) 176/76   Pulse 78   Temp 98.3 °F (36.8 °C)   Resp 14   SpO2 100%   Breastfeeding No     GEN: Lying in bed in NAD  RESP: comfortable on room air  CV: RRR  ABD: soft, prior surgical scars, mildly TTP in LUQ  NEURO: awake and interactive  PSYCH: normal mood    Endoscopy and risks explained to the patient. Risks including reaction to sedation, cardiopulmonary events, infection, bleeding, perforation, requirement for surgery if complications should occur, death were explained to patient who expressed understanding and agreed to proceed with endoscopy. Dilip Krishnamurthy MD   [facility]                            Patient:   Luis Mason  YOB: 1980   Date:                        11/16/2021 2:15 PM   Visit Type:                  Office Visit  Provider:   KENDAL Luque  Referring Provider:  Brii Mazariegos MD [facility]  Primary Care Provider: Jocelyn Little MD [facility]    This 41year old  patient was referred by Brii Mazariegos MD.  This 41year old female presents for Abdominal pain and N/V. History of Present Illness:  1. Abdominal pain   2. N/V     Ms. Pio Rachel is a patient of Dr. Etienne Cortez who is seen today at the request of Dr. Moe Mccoy for further evaluation of abdominal pain and N/V. Last EGD in 2018 for BRENDON revealed s/p gastric bypass. Duodenal bxs were benign. She is due for surveillance colonoscopy last 12/2020 for hx of TVA, not done. She was doing well until Oct 20th, she started to feel unwell. From there, she had projectile vomiting, severe gnawing LLQ, and diarrhea. She presented to [facility] ER. A/P CT scan revealed s/p Margarita-en-Y gastric bypass surgery with transient intussusception. Per patient, surgery was consulted, but no surgical intervention pursued. She continues to have transient LLQ abdominal pain and 1-3 loose BMs a day including nocturnal diarrhea. Stools can appear tan and jelly like. She is still nauseated, no further vomiting. No F/C. Appetite is poor due to symptoms. PROBLEM LIST:     Problem Description Onset Date Chronic Clinical Status Notes   BRENDON - Iron deficiency anaemia  Y       Past Medical/Surgical History:   (Detailed)  Disease/disorder Onset Date Management Date Comments   Margarita en Y gastric bypass    [facility] 2018 -   Morbid obesity    [facility] 2018 -     Cholecystectomy       Family History:  (Detailed)  Relationship Family Member Name  Age at Death Condition Onset Age Cause of Death   Father    Colon polyps  N   Paternal grandmother    Cancer, colon 55 N       Social History:  (Detailed)  Preferred language is [place]. Current Medications:  Medication Directions   dicyclomine 20 mg tablet take 1 tablet by oral route 4 times every day   norethindrone-e.estradiol-iron chew 1 tablet by oral route  every day   promethazine 25 mg tablet take 1 tablet by oral route  every 4 - 6 hours as needed   sertraline 25 mg tablet take 1 tablet by oral route  every day   Tylenol Extra Strength 500 mg tablet take 2 tablet by oral route  every 6 hours as needed     Allergies:  Ingredient Reaction (Severity) Medication Name Comment   ADHESIVE      LATEX      NSAIDS (NON-STEROIDAL ANTI-INFLAMMATORY DRUG)      SOY      WHEY          Review of Systems:  System Neg/Pos Details   Constitutional Positive Fatigue. GI Positive Abdominal pain, Diarrhea, Heartburn, Reflux, Vomiting. Neuro Positive Headache (The severity is described as severe). Integumentary Positive Itching skin. MS Positive Joint pain, Myalgia. Hema/Lymph Positive Anemia. Reproductive Positive Breast lumps. All other review of systems are negative.     Vital Signs:  BP mm/Hg Pulse Resp Pulse Ox Temp F Ht (Total in.) Weight (lbs.) Weight (oz.) BMI   178/102 84 18 100 98.40 66.50 319.80  50.84     Physical Exam:  GENERAL: A well nourished, well hydrated patient who appears their stated age. SKIN: Extremities and face reveal no rashes. No palmar erythema. HEENT: Sclerae anicteric. No oral ulcerations or abnormal pigment of the lips. PERRLA  CARDIOVASCULAR: RRR. No M/G/R. Carotids without bruits. RESPIRATORY:  Clear breath sounds with no rales or wheezes. GI: The abdomen is OBESE, soft, ND with NABS. MILDLY TTP LLQ without G/R. Neg for HSM. No masses palpable. MUSCULOSKELETAL: Gait appears steady. No pitting edema of the lower legs. NEUROLOGICAL: Gross memory appears intact. A&O x3  PSYCHIATRIC: Mood appears appropriate with judgement intact. LYMPHATIC: No cervical or supraclavicular adenopathy. Assessment/Plan:  # Detail Type Description    1. Assessment LLQ abdominal pain (R10.32). 2. Assessment Abnormal CT scan, colon (R93.3). 3. Assessment Diarrhea in adult patient (R19.7). 4. Assessment Nausea (R11.0). Provider Plan 40 y/o F s/p Margarita-en-Y gastric bypass surgery who presents with LLQ abdominal and N/V that began on 10/20/21. [facility] with [facility]P CT scan that revealed transient intussusception what appears to be near the jejunojejunostomy. Given the location of the intussusception, we will arrange a push enteroscopy with pediatric colonoscopy to further evaluate. From there, we can consider repeating CT scan and have Bariatric surgery reevaluate the need for surgery. Check stools for infectious etiology: C diff, O/P, & CNS. ASA PS III  Plan procedure includes push enteroscopy with peds colonoscopy - outpatient hospital setting given the BMI of 50. The risks of the procedures were discussed with patient including but not limited to anesthesia, bleeding, and perforation. Patient verbalizes understanding and wishes to proceed with EGD.  Any modifications to patient's medications discussed at today's visit    Further recommendations pending endoscopic findings     Total time spent face to face with patient and reviewing imaging and [facility] records - 40 minutes. Elements of this note may have been dictated using speech recognition software. As a result, errors of speech recognition may have occurred.       Provider:   Lorne Chang 11/16/2021 3:19 PM   Document generated by: Sean Lamar 11/16/2021    CC Providers:  Janell Farias MD, MD  [address]-      Electronically signed by Sean NORIEGA on 11/16/2021 03:20 PM

## 2021-11-19 NOTE — ANESTHESIA PREPROCEDURE EVALUATION
Relevant Problems   HEMATOLOGY   (+) BRENDON (iron deficiency anemia)       Anesthetic History   No history of anesthetic complications            Review of Systems / Medical History  Patient summary reviewed, nursing notes reviewed and pertinent labs reviewed    Pulmonary  Within defined limits                 Neuro/Psych   Within defined limits           Cardiovascular    Hypertension              Exercise tolerance: >4 METS     GI/Hepatic/Renal  Within defined limits              Endo/Other  Within defined limits           Other Findings              Physical Exam    Airway  Mallampati: III  TM Distance: 4 - 6 cm  Neck ROM: normal range of motion   Mouth opening: Normal     Cardiovascular  Regular rate and rhythm,  S1 and S2 normal,  no murmur, click, rub, or gallop             Dental  No notable dental hx       Pulmonary  Breath sounds clear to auscultation               Abdominal         Other Findings            Anesthetic Plan    ASA: 2  Anesthesia type: total IV anesthesia          Induction: Intravenous  Anesthetic plan and risks discussed with: Patient      Gastric bypass

## 2021-11-19 NOTE — DISCHARGE INSTRUCTIONS
Gastrointestinal Esophagogastroduodenoscopy (EGD) - Upper Exam Discharge Instructions    1. Call Dr. Huy Barrera at [phone] for any problems or questions. 2. Contact the doctor's office for follow up appointment as directed. 3. Medication may cause drowsiness for several hours, therefore:  · Do not drive or operate machinery for remainder of the day. · No alcohol today. · Do not make any important or legal decisions for 24 hours. · Do not sign any legal documents for 24 hours. 5. Ordinarily, you may resume regular diet and activity after exam unless otherwise specified by your physician. 6. For mild soreness in your throat you may use Cepacol throat lozenges or warm salt-water gargles as needed. Any additional instructions:  1. Office will call you with pathology report and future follow up appointments. 2. Follow up appointment with Dr. Ramona Seo in 2-3 months.

## 2021-11-19 NOTE — ROUTINE PROCESS
Discharge instructions provided to and gone over with patient and family member. Opportunity for questions provided and all answered. Patient discharged in stable condition with no complaints or issues voiced. Patient wheeled to car by staff and discharged safely.

## 2021-11-19 NOTE — PROGRESS NOTES
's visit attempted in prep area and waiting room. According to staff Ms. Judy Sharp had not arrived.  follow-up is planned.      Maycol Chang 66  Board Certified

## 2021-12-14 PROBLEM — K56.609 SBO (SMALL BOWEL OBSTRUCTION) (HCC): Status: ACTIVE | Noted: 2021-12-14

## 2021-12-20 RX ORDER — SODIUM CHLORIDE 0.9 % (FLUSH) 0.9 %
5-40 SYRINGE (ML) INJECTION AS NEEDED
Status: CANCELLED | OUTPATIENT
Start: 2021-12-20

## 2021-12-20 RX ORDER — SODIUM CHLORIDE 0.9 % (FLUSH) 0.9 %
5-40 SYRINGE (ML) INJECTION EVERY 8 HOURS
Status: CANCELLED | OUTPATIENT
Start: 2021-12-20

## 2021-12-27 ENCOUNTER — HOSPITAL ENCOUNTER (OUTPATIENT)
Dept: GENERAL RADIOLOGY | Age: 41
Discharge: HOME | End: 2021-12-27
Attending: SURGERY
Payer: COMMERCIAL

## 2021-12-27 ENCOUNTER — HOSPITAL ENCOUNTER (OUTPATIENT)
Dept: GENERAL RADIOLOGY | Age: 41
End: 2021-12-27
Payer: COMMERCIAL

## 2021-12-27 ENCOUNTER — HOSPITAL ENCOUNTER (OUTPATIENT)
Dept: SURGERY | Age: 41
Discharge: HOME | End: 2021-12-27
Payer: COMMERCIAL

## 2021-12-27 LAB
ALBUMIN SERPL-MCNC: 3.6 G/DL (ref 3.5–5)
ALBUMIN/GLOB SERPL: 1 {RATIO} (ref 1.2–3.5)
ALP SERPL-CCNC: 105 U/L (ref 50–136)
ALT SERPL-CCNC: 25 U/L (ref 12–65)
ANION GAP SERPL CALC-SCNC: 4 MMOL/L (ref 7–16)
APPEARANCE UR: CLEAR
AST SERPL-CCNC: 12 U/L (ref 15–37)
BACTERIA URNS QL MICRO: 0 /HPF
BASOPHILS # BLD: 0.1 K/UL (ref 0–0.2)
BASOPHILS NFR BLD: 1 % (ref 0–2)
BILIRUB SERPL-MCNC: 0.2 MG/DL (ref 0.2–1.1)
BILIRUB UR QL: NEGATIVE
BUN SERPL-MCNC: 6 MG/DL (ref 6–23)
CALCIUM SERPL-MCNC: 8.4 MG/DL (ref 8.3–10.4)
CASTS URNS QL MICRO: ABNORMAL /LPF
CHLORIDE SERPL-SCNC: 113 MMOL/L (ref 98–107)
CO2 SERPL-SCNC: 25 MMOL/L (ref 21–32)
COLOR UR: YELLOW
CREAT SERPL-MCNC: 0.7 MG/DL (ref 0.6–1)
DIFFERENTIAL METHOD BLD: ABNORMAL
EOSINOPHIL # BLD: 0.2 K/UL (ref 0–0.8)
EOSINOPHIL NFR BLD: 3 % (ref 0.5–7.8)
EPI CELLS #/AREA URNS HPF: ABNORMAL /HPF
ERYTHROCYTE [DISTWIDTH] IN BLOOD BY AUTOMATED COUNT: 14.7 % (ref 11.9–14.6)
GLOBULIN SER CALC-MCNC: 3.6 G/DL (ref 2.3–3.5)
GLUCOSE SERPL-MCNC: 91 MG/DL (ref 65–100)
GLUCOSE UR STRIP.AUTO-MCNC: NEGATIVE MG/DL
HCT VFR BLD AUTO: 38 % (ref 35.8–46.3)
HGB BLD-MCNC: 12.1 G/DL (ref 11.7–15.4)
HGB UR QL STRIP: NEGATIVE
IMM GRANULOCYTES # BLD AUTO: 0 K/UL (ref 0–0.5)
IMM GRANULOCYTES NFR BLD AUTO: 0 % (ref 0–5)
INR PPP: 1
KETONES UR QL STRIP.AUTO: ABNORMAL MG/DL
LEUKOCYTE ESTERASE UR QL STRIP.AUTO: NEGATIVE
LYMPHOCYTES # BLD: 1.5 K/UL (ref 0.5–4.6)
LYMPHOCYTES NFR BLD: 23 % (ref 13–44)
MCH RBC QN AUTO: 27.4 PG (ref 26.1–32.9)
MCHC RBC AUTO-ENTMCNC: 31.8 G/DL (ref 31.4–35)
MCV RBC AUTO: 86.2 FL (ref 79.6–97.8)
MONOCYTES # BLD: 0.5 K/UL (ref 0.1–1.3)
MONOCYTES NFR BLD: 7 % (ref 4–12)
NEUTS SEG # BLD: 4.3 K/UL (ref 1.7–8.2)
NEUTS SEG NFR BLD: 66 % (ref 43–78)
NITRITE UR QL STRIP.AUTO: NEGATIVE
NRBC # BLD: 0 K/UL (ref 0–0.2)
PH UR STRIP: 5.5 [PH] (ref 5–9)
PLATELET # BLD AUTO: 222 K/UL (ref 150–450)
PMV BLD AUTO: 11.6 FL (ref 9.4–12.3)
POTASSIUM SERPL-SCNC: 3.5 MMOL/L (ref 3.5–5.1)
PROT SERPL-MCNC: 7.2 G/DL (ref 6.3–8.2)
PROT UR STRIP-MCNC: NEGATIVE MG/DL
PROTHROMBIN TIME: 13.9 SEC (ref 12.6–14.5)
RBC # BLD AUTO: 4.41 M/UL (ref 4.05–5.2)
RBC #/AREA URNS HPF: ABNORMAL /HPF
SODIUM SERPL-SCNC: 142 MMOL/L (ref 136–145)
SP GR UR REFRACTOMETRY: ABNORMAL (ref 1–1.02)
UROBILINOGEN UR QL STRIP.AUTO: 0.2 EU/DL (ref 0.2–1)
WBC # BLD AUTO: 6.6 K/UL (ref 4.3–11.1)
WBC URNS QL MICRO: ABNORMAL /HPF

## 2021-12-27 PROCEDURE — 81003 URINALYSIS AUTO W/O SCOPE: CPT

## 2021-12-27 PROCEDURE — 85610 PROTHROMBIN TIME: CPT

## 2021-12-27 PROCEDURE — 80053 COMPREHEN METABOLIC PANEL: CPT

## 2021-12-27 PROCEDURE — 85025 COMPLETE CBC W/AUTO DIFF WBC: CPT

## 2021-12-27 PROCEDURE — 71046 X-RAY EXAM CHEST 2 VIEWS: CPT

## 2021-12-27 PROCEDURE — 93005 ELECTROCARDIOGRAM TRACING: CPT | Performed by: SURGERY

## 2021-12-27 NOTE — PERIOP NOTES
Patient verified name and     Order for consent was found in EHR and matches case posting; patient verified. Type 3 surgery, walk in assessment complete. Labs per surgeon: cbc,cmp, ua pt/inr collected and results in connect care; c xray results pending, chart flagged for follow up  Labs per anesthesia protocol: no additional  EKG: EKG collected and results wdl of anesthesia protocol. Echo results in connect care 2020    Patient COVID test scheduled 2021 The testing center is located at the [facility] [address] [place] [address]. If appointment is needed patient provided telephone number of [phone]. Hospital approved surgical skin cleanser and instructions given per hospital policy. Patient provided with and instructed on educational handouts including Guide to Surgery, Pain Management, Hand Hygiene, Blood Transfusion Education, and [facility] Brochure. Patient answered medical/surgical history questions at their best of ability. All prior to admission medications documented in Connect Care. Original medication prescription bottle was not visualized during patient appointment. Patient teach back successful and patient demonstrates knowledge of instructions. PLEASE CONTINUE TAKING ALL PRESCRIPTION MEDICATIONS UP TO THE DAY OF SURGERY UNLESS OTHERWISE DIRECTED BELOW. DISCONTINUE all vitamins and supplements 7 days prior to surgery. DISCONTINUE Non-Steriodal Anti-Inflammatory (NSAIDS) such as Advil and Aleve 5 days prior to surgery. Home Medications to take  the day of surgery    NONE           Home Medications   to Hold   NONE        Comments    Covid test  AT 10:50 @ [address], [place]    On the day before surgery please take Acetaminophen 1000mg in the morning and then again before bed. You may substitute for Tylenol 650 mg.            Please do not bring home medications with you on the day of surgery unless otherwise directed by your nurse. If you are instructed to bring home medications, please give them to your nurse as they will be administered by the nursing staff. If you have any questions, please call [facility] [phone] or [address] [place] [address] [phone]. A copy of this note was provided to the patient for reference.

## 2021-12-28 VITALS
SYSTOLIC BLOOD PRESSURE: 162 MMHG | HEART RATE: 82 BPM | WEIGHT: 293 LBS | BODY MASS INDEX: 45.99 KG/M2 | HEIGHT: 67 IN | OXYGEN SATURATION: 100 % | TEMPERATURE: 99.3 F | RESPIRATION RATE: 18 BRPM | DIASTOLIC BLOOD PRESSURE: 98 MMHG

## 2021-12-28 LAB
ATRIAL RATE: 74 BPM
CALCULATED P AXIS, ECG09: 40 DEGREES
CALCULATED R AXIS, ECG10: 10 DEGREES
CALCULATED T AXIS, ECG11: 19 DEGREES
DIAGNOSIS, 93000: NORMAL
P-R INTERVAL, ECG05: 148 MS
Q-T INTERVAL, ECG07: 380 MS
QRS DURATION, ECG06: 82 MS
QTC CALCULATION (BEZET), ECG08: 421 MS
VENTRICULAR RATE, ECG03: 74 BPM

## 2021-12-28 NOTE — PERIOP NOTES
Study Result    Narrative & Impression   TWO-VIEW CHEST:     CLINICAL HISTORY: Preoperative evaluation.     COMPARISON:  None.     FINDINGS: PA and lateral chest images demonstrate no acute pneumonic infiltrate  or significant pleural fluid collection. The heart size is within normal limits  without evidence of congestive heart failure or pneumothorax. The bony thorax  appears intact on these views. The lateral image is limited by incomplete  elevation of the patient's arms.     IMPRESSION  NO ACUTE CARDIOPULMONARY DISEASE IDENTIFIED. CXR within anesthesia guidelines, no further action required.

## 2021-12-28 NOTE — PERIOP NOTES
Called and received fax information from [facility]. Fax #  [phone] received.  Surgical hx request faxed to [facility]

## 2022-01-02 ENCOUNTER — ANESTHESIA EVENT (OUTPATIENT)
Dept: SURGERY | Age: 42
DRG: 336 | End: 2022-01-02
Payer: COMMERCIAL

## 2022-01-03 ENCOUNTER — HOSPITAL ENCOUNTER (INPATIENT)
Age: 42
LOS: 3 days | Discharge: HOME | DRG: 336 | End: 2022-01-06
Attending: SURGERY | Admitting: SURGERY
Payer: COMMERCIAL

## 2022-01-03 ENCOUNTER — ANESTHESIA (OUTPATIENT)
Dept: SURGERY | Age: 42
DRG: 336 | End: 2022-01-03
Payer: COMMERCIAL

## 2022-01-03 DIAGNOSIS — Z98.890 S/P LAPAROTOMY: Primary | ICD-10-CM

## 2022-01-03 DIAGNOSIS — K56.609 SBO (SMALL BOWEL OBSTRUCTION) (HCC): ICD-10-CM

## 2022-01-03 LAB
ABO + RH BLD: NORMAL
BLOOD GROUP ANTIBODIES SERPL: NORMAL
HCG UR QL: NEGATIVE
SPECIMEN EXP DATE BLD: NORMAL

## 2022-01-03 PROCEDURE — 86900 BLOOD TYPING SEROLOGIC ABO: CPT

## 2022-01-03 PROCEDURE — 74011250636 HC RX REV CODE- 250/636: Performed by: SURGERY

## 2022-01-03 PROCEDURE — 77030039425 HC BLD LARYNG TRULITE DISP TELE -A: Performed by: ANESTHESIOLOGY

## 2022-01-03 PROCEDURE — 0DCA0ZZ EXTIRPATION OF MATTER FROM JEJUNUM, OPEN APPROACH: ICD-10-PCS | Performed by: SURGERY

## 2022-01-03 PROCEDURE — 77030037088 HC TUBE ENDOTRACH ORAL NSL COVD-A: Performed by: ANESTHESIOLOGY

## 2022-01-03 PROCEDURE — 88300 SURGICAL PATH GROSS: CPT

## 2022-01-03 PROCEDURE — 74011250636 HC RX REV CODE- 250/636: Performed by: ANESTHESIOLOGY

## 2022-01-03 PROCEDURE — 76060000034 HC ANESTHESIA 1.5 TO 2 HR: Performed by: SURGERY

## 2022-01-03 PROCEDURE — 76210000006 HC OR PH I REC 0.5 TO 1 HR: Performed by: SURGERY

## 2022-01-03 PROCEDURE — 77030040361 HC SLV COMPR DVT MDII -B: Performed by: SURGERY

## 2022-01-03 PROCEDURE — 76010000162 HC OR TIME 1.5 TO 2 HR INTENSV-TIER 1: Performed by: SURGERY

## 2022-01-03 PROCEDURE — 74011250636 HC RX REV CODE- 250/636: Performed by: NURSE ANESTHETIST, CERTIFIED REGISTERED

## 2022-01-03 PROCEDURE — 2709999900 HC NON-CHARGEABLE SUPPLY

## 2022-01-03 PROCEDURE — 77030008462 HC STPLR SKN PROX J&J -A: Performed by: SURGERY

## 2022-01-03 PROCEDURE — 77030008771 HC TU NG SALEM SUMP -A: Performed by: ANESTHESIOLOGY

## 2022-01-03 PROCEDURE — 77030018836 HC SOL IRR NACL ICUM -A: Performed by: SURGERY

## 2022-01-03 PROCEDURE — 77030019908 HC STETH ESOPH SIMS -A: Performed by: ANESTHESIOLOGY

## 2022-01-03 PROCEDURE — 77030034698 HC LIGASURE MRYLND OPN SEAL DIV COVD -F: Performed by: SURGERY

## 2022-01-03 PROCEDURE — 2709999900 HC NON-CHARGEABLE SUPPLY: Performed by: SURGERY

## 2022-01-03 PROCEDURE — 74011000250 HC RX REV CODE- 250: Performed by: NURSE ANESTHETIST, CERTIFIED REGISTERED

## 2022-01-03 PROCEDURE — 65270000029 HC RM PRIVATE

## 2022-01-03 PROCEDURE — 77030002996 HC SUT SLK J&J -A: Performed by: SURGERY

## 2022-01-03 PROCEDURE — 74011250637 HC RX REV CODE- 250/637: Performed by: ANESTHESIOLOGY

## 2022-01-03 PROCEDURE — 74011000250 HC RX REV CODE- 250: Performed by: SURGERY

## 2022-01-03 PROCEDURE — 44005 FREEING OF BOWEL ADHESION: CPT | Performed by: SURGERY

## 2022-01-03 PROCEDURE — 81025 URINE PREGNANCY TEST: CPT

## 2022-01-03 PROCEDURE — 77030040830 HC CATH URETH FOL MDII -A: Performed by: SURGERY

## 2022-01-03 PROCEDURE — 74011250637 HC RX REV CODE- 250/637: Performed by: SURGERY

## 2022-01-03 PROCEDURE — 0DNW0ZZ RELEASE PERITONEUM, OPEN APPROACH: ICD-10-PCS | Performed by: SURGERY

## 2022-01-03 PROCEDURE — 77030002966 HC SUT PDS J&J -A: Performed by: SURGERY

## 2022-01-03 PROCEDURE — 77030011264 HC ELECTRD BLD EXT COVD -A: Performed by: SURGERY

## 2022-01-03 PROCEDURE — 77030005206: Performed by: SURGERY

## 2022-01-03 RX ORDER — ONDANSETRON 2 MG/ML
INJECTION INTRAMUSCULAR; INTRAVENOUS AS NEEDED
Status: DISCONTINUED | OUTPATIENT
Start: 2022-01-03 | End: 2022-01-03 | Stop reason: HOSPADM

## 2022-01-03 RX ORDER — HYDROMORPHONE HYDROCHLORIDE 2 MG/ML
1 INJECTION, SOLUTION INTRAMUSCULAR; INTRAVENOUS; SUBCUTANEOUS
Status: DISCONTINUED | OUTPATIENT
Start: 2022-01-03 | End: 2022-01-03 | Stop reason: SDUPTHER

## 2022-01-03 RX ORDER — MIDAZOLAM HYDROCHLORIDE 1 MG/ML
2 INJECTION, SOLUTION INTRAMUSCULAR; INTRAVENOUS
Status: DISCONTINUED | OUTPATIENT
Start: 2022-01-03 | End: 2022-01-03 | Stop reason: HOSPADM

## 2022-01-03 RX ORDER — DOCUSATE SODIUM 100 MG/1
100 CAPSULE, LIQUID FILLED ORAL 2 TIMES DAILY
Status: DISCONTINUED | OUTPATIENT
Start: 2022-01-03 | End: 2022-01-06 | Stop reason: HOSPADM

## 2022-01-03 RX ORDER — SUCCINYLCHOLINE CHLORIDE 20 MG/ML
INJECTION INTRAMUSCULAR; INTRAVENOUS AS NEEDED
Status: DISCONTINUED | OUTPATIENT
Start: 2022-01-03 | End: 2022-01-03 | Stop reason: HOSPADM

## 2022-01-03 RX ORDER — MIDAZOLAM HYDROCHLORIDE 1 MG/ML
INJECTION, SOLUTION INTRAMUSCULAR; INTRAVENOUS AS NEEDED
Status: DISCONTINUED | OUTPATIENT
Start: 2022-01-03 | End: 2022-01-03 | Stop reason: HOSPADM

## 2022-01-03 RX ORDER — FENTANYL CITRATE 50 UG/ML
100 INJECTION, SOLUTION INTRAMUSCULAR; INTRAVENOUS ONCE
Status: DISCONTINUED | OUTPATIENT
Start: 2022-01-03 | End: 2022-01-03 | Stop reason: HOSPADM

## 2022-01-03 RX ORDER — ONDANSETRON 2 MG/ML
4 INJECTION INTRAMUSCULAR; INTRAVENOUS
Status: DISCONTINUED | OUTPATIENT
Start: 2022-01-03 | End: 2022-01-03 | Stop reason: HOSPADM

## 2022-01-03 RX ORDER — SODIUM CHLORIDE 0.9 % (FLUSH) 0.9 %
5-40 SYRINGE (ML) INJECTION AS NEEDED
Status: DISCONTINUED | OUTPATIENT
Start: 2022-01-03 | End: 2022-01-03 | Stop reason: HOSPADM

## 2022-01-03 RX ORDER — HYDROMORPHONE HYDROCHLORIDE 2 MG/ML
0.5 INJECTION, SOLUTION INTRAMUSCULAR; INTRAVENOUS; SUBCUTANEOUS
Status: COMPLETED | OUTPATIENT
Start: 2022-01-03 | End: 2022-01-03

## 2022-01-03 RX ORDER — KETAMINE HYDROCHLORIDE 50 MG/ML
INJECTION, SOLUTION INTRAMUSCULAR; INTRAVENOUS AS NEEDED
Status: DISCONTINUED | OUTPATIENT
Start: 2022-01-03 | End: 2022-01-03 | Stop reason: HOSPADM

## 2022-01-03 RX ORDER — SODIUM CHLORIDE, SODIUM LACTATE, POTASSIUM CHLORIDE, CALCIUM CHLORIDE 600; 310; 30; 20 MG/100ML; MG/100ML; MG/100ML; MG/100ML
25 INJECTION, SOLUTION INTRAVENOUS CONTINUOUS
Status: DISCONTINUED | OUTPATIENT
Start: 2022-01-03 | End: 2022-01-03 | Stop reason: HOSPADM

## 2022-01-03 RX ORDER — NALOXONE HYDROCHLORIDE 0.4 MG/ML
0.2 INJECTION, SOLUTION INTRAMUSCULAR; INTRAVENOUS; SUBCUTANEOUS AS NEEDED
Status: DISCONTINUED | OUTPATIENT
Start: 2022-01-03 | End: 2022-01-03 | Stop reason: HOSPADM

## 2022-01-03 RX ORDER — LIDOCAINE HYDROCHLORIDE 10 MG/ML
0.1 INJECTION INFILTRATION; PERINEURAL AS NEEDED
Status: DISCONTINUED | OUTPATIENT
Start: 2022-01-03 | End: 2022-01-03 | Stop reason: HOSPADM

## 2022-01-03 RX ORDER — PROPOFOL 10 MG/ML
INJECTION, EMULSION INTRAVENOUS AS NEEDED
Status: DISCONTINUED | OUTPATIENT
Start: 2022-01-03 | End: 2022-01-03 | Stop reason: HOSPADM

## 2022-01-03 RX ORDER — SODIUM CHLORIDE, SODIUM LACTATE, POTASSIUM CHLORIDE, CALCIUM CHLORIDE 600; 310; 30; 20 MG/100ML; MG/100ML; MG/100ML; MG/100ML
75 INJECTION, SOLUTION INTRAVENOUS CONTINUOUS
Status: DISCONTINUED | OUTPATIENT
Start: 2022-01-03 | End: 2022-01-03 | Stop reason: HOSPADM

## 2022-01-03 RX ORDER — ACETAMINOPHEN 500 MG
1000 TABLET ORAL ONCE
Status: COMPLETED | OUTPATIENT
Start: 2022-01-03 | End: 2022-01-03

## 2022-01-03 RX ORDER — HYDROMORPHONE HYDROCHLORIDE 1 MG/ML
1 INJECTION, SOLUTION INTRAMUSCULAR; INTRAVENOUS; SUBCUTANEOUS
Status: DISCONTINUED | OUTPATIENT
Start: 2022-01-03 | End: 2022-01-04

## 2022-01-03 RX ORDER — SODIUM CHLORIDE, SODIUM LACTATE, POTASSIUM CHLORIDE, CALCIUM CHLORIDE 600; 310; 30; 20 MG/100ML; MG/100ML; MG/100ML; MG/100ML
125 INJECTION, SOLUTION INTRAVENOUS CONTINUOUS
Status: DISCONTINUED | OUTPATIENT
Start: 2022-01-03 | End: 2022-01-04

## 2022-01-03 RX ORDER — HYDROMORPHONE HYDROCHLORIDE 2 MG/ML
0.5 INJECTION, SOLUTION INTRAMUSCULAR; INTRAVENOUS; SUBCUTANEOUS ONCE
Status: COMPLETED | OUTPATIENT
Start: 2022-01-03 | End: 2022-01-03

## 2022-01-03 RX ORDER — LIDOCAINE HYDROCHLORIDE ANHYDROUS AND DEXTROSE MONOHYDRATE .8; 5 G/100ML; G/100ML
INJECTION, SOLUTION INTRAVENOUS
Status: DISCONTINUED | OUTPATIENT
Start: 2022-01-03 | End: 2022-01-03 | Stop reason: HOSPADM

## 2022-01-03 RX ORDER — SODIUM CHLORIDE 0.9 % (FLUSH) 0.9 %
5-40 SYRINGE (ML) INJECTION AS NEEDED
Status: DISCONTINUED | OUTPATIENT
Start: 2022-01-03 | End: 2022-01-06 | Stop reason: HOSPADM

## 2022-01-03 RX ORDER — FENTANYL CITRATE 50 UG/ML
INJECTION, SOLUTION INTRAMUSCULAR; INTRAVENOUS AS NEEDED
Status: DISCONTINUED | OUTPATIENT
Start: 2022-01-03 | End: 2022-01-03 | Stop reason: HOSPADM

## 2022-01-03 RX ORDER — SODIUM CHLORIDE 0.9 % (FLUSH) 0.9 %
5-40 SYRINGE (ML) INJECTION EVERY 8 HOURS
Status: DISCONTINUED | OUTPATIENT
Start: 2022-01-03 | End: 2022-01-06 | Stop reason: HOSPADM

## 2022-01-03 RX ORDER — ENOXAPARIN SODIUM 100 MG/ML
40 INJECTION SUBCUTANEOUS EVERY 12 HOURS
Status: DISCONTINUED | OUTPATIENT
Start: 2022-01-04 | End: 2022-01-06 | Stop reason: HOSPADM

## 2022-01-03 RX ORDER — ROCURONIUM BROMIDE 10 MG/ML
INJECTION, SOLUTION INTRAVENOUS AS NEEDED
Status: DISCONTINUED | OUTPATIENT
Start: 2022-01-03 | End: 2022-01-03 | Stop reason: HOSPADM

## 2022-01-03 RX ORDER — METRONIDAZOLE 500 MG/100ML
500 INJECTION, SOLUTION INTRAVENOUS ONCE
Status: COMPLETED | OUTPATIENT
Start: 2022-01-03 | End: 2022-01-03

## 2022-01-03 RX ORDER — GABAPENTIN 300 MG/1
300 CAPSULE ORAL 3 TIMES DAILY
Status: DISCONTINUED | OUTPATIENT
Start: 2022-01-03 | End: 2022-01-05

## 2022-01-03 RX ORDER — HALOPERIDOL 5 MG/ML
1 INJECTION INTRAMUSCULAR
Status: DISCONTINUED | OUTPATIENT
Start: 2022-01-03 | End: 2022-01-03 | Stop reason: HOSPADM

## 2022-01-03 RX ORDER — SODIUM CHLORIDE 0.9 % (FLUSH) 0.9 %
5-40 SYRINGE (ML) INJECTION EVERY 8 HOURS
Status: DISCONTINUED | OUTPATIENT
Start: 2022-01-03 | End: 2022-01-03 | Stop reason: HOSPADM

## 2022-01-03 RX ORDER — ONDANSETRON 2 MG/ML
4 INJECTION INTRAMUSCULAR; INTRAVENOUS
Status: DISCONTINUED | OUTPATIENT
Start: 2022-01-03 | End: 2022-01-06 | Stop reason: HOSPADM

## 2022-01-03 RX ORDER — LIDOCAINE HYDROCHLORIDE 20 MG/ML
INJECTION, SOLUTION EPIDURAL; INFILTRATION; INTRACAUDAL; PERINEURAL AS NEEDED
Status: DISCONTINUED | OUTPATIENT
Start: 2022-01-03 | End: 2022-01-03 | Stop reason: HOSPADM

## 2022-01-03 RX ORDER — MIDAZOLAM HYDROCHLORIDE 1 MG/ML
2 INJECTION, SOLUTION INTRAMUSCULAR; INTRAVENOUS ONCE
Status: DISCONTINUED | OUTPATIENT
Start: 2022-01-03 | End: 2022-01-03 | Stop reason: HOSPADM

## 2022-01-03 RX ORDER — HYDROMORPHONE HYDROCHLORIDE 2 MG/ML
INJECTION, SOLUTION INTRAMUSCULAR; INTRAVENOUS; SUBCUTANEOUS AS NEEDED
Status: DISCONTINUED | OUTPATIENT
Start: 2022-01-03 | End: 2022-01-03 | Stop reason: HOSPADM

## 2022-01-03 RX ORDER — SCOLOPAMINE TRANSDERMAL SYSTEM 1 MG/1
1 PATCH, EXTENDED RELEASE TRANSDERMAL ONCE
Status: COMPLETED | OUTPATIENT
Start: 2022-01-03 | End: 2022-01-06

## 2022-01-03 RX ORDER — OXYCODONE HYDROCHLORIDE 5 MG/1
5 TABLET ORAL
Status: DISCONTINUED | OUTPATIENT
Start: 2022-01-03 | End: 2022-01-03 | Stop reason: HOSPADM

## 2022-01-03 RX ORDER — NALOXONE HYDROCHLORIDE 0.4 MG/ML
0.2 INJECTION, SOLUTION INTRAMUSCULAR; INTRAVENOUS; SUBCUTANEOUS
Status: DISCONTINUED | OUTPATIENT
Start: 2022-01-03 | End: 2022-01-03 | Stop reason: HOSPADM

## 2022-01-03 RX ORDER — DEXAMETHASONE SODIUM PHOSPHATE 4 MG/ML
INJECTION, SOLUTION INTRA-ARTICULAR; INTRALESIONAL; INTRAMUSCULAR; INTRAVENOUS; SOFT TISSUE AS NEEDED
Status: DISCONTINUED | OUTPATIENT
Start: 2022-01-03 | End: 2022-01-03 | Stop reason: HOSPADM

## 2022-01-03 RX ORDER — SERTRALINE HYDROCHLORIDE 25 MG/1
25 TABLET, FILM COATED ORAL
Status: DISCONTINUED | OUTPATIENT
Start: 2022-01-03 | End: 2022-01-06 | Stop reason: HOSPADM

## 2022-01-03 RX ADMIN — HYDROMORPHONE HYDROCHLORIDE 0.2 MG: 2 INJECTION INTRAMUSCULAR; INTRAVENOUS; SUBCUTANEOUS at 08:58

## 2022-01-03 RX ADMIN — DOCUSATE SODIUM 100 MG: 100 CAPSULE ORAL at 21:59

## 2022-01-03 RX ADMIN — GABAPENTIN 300 MG: 300 CAPSULE ORAL at 21:58

## 2022-01-03 RX ADMIN — ROCURONIUM BROMIDE 10 MG: 10 INJECTION, SOLUTION INTRAVENOUS at 08:10

## 2022-01-03 RX ADMIN — HYDROMORPHONE HYDROCHLORIDE 0.5 MG: 2 INJECTION, SOLUTION INTRAMUSCULAR; INTRAVENOUS; SUBCUTANEOUS at 09:25

## 2022-01-03 RX ADMIN — ROCURONIUM BROMIDE 20 MG: 10 INJECTION, SOLUTION INTRAVENOUS at 07:59

## 2022-01-03 RX ADMIN — HYDROMORPHONE HYDROCHLORIDE 0.5 MG: 2 INJECTION INTRAMUSCULAR; INTRAVENOUS; SUBCUTANEOUS at 10:00

## 2022-01-03 RX ADMIN — KETAMINE HYDROCHLORIDE 25 MG: 50 INJECTION INTRAMUSCULAR; INTRAVENOUS at 08:32

## 2022-01-03 RX ADMIN — PROPOFOL 200 MG: 10 INJECTION, EMULSION INTRAVENOUS at 07:31

## 2022-01-03 RX ADMIN — KETAMINE HYDROCHLORIDE 25 MG: 50 INJECTION INTRAMUSCULAR; INTRAVENOUS at 09:05

## 2022-01-03 RX ADMIN — ACETAMINOPHEN 1000 MG: 500 TABLET ORAL at 06:44

## 2022-01-03 RX ADMIN — SUCCINYLCHOLINE CHLORIDE 180 MG: 20 INJECTION, SOLUTION INTRAMUSCULAR; INTRAVENOUS at 07:32

## 2022-01-03 RX ADMIN — LIDOCAINE HYDROCHLORIDE 80 MG: 20 INJECTION, SOLUTION EPIDURAL; INFILTRATION; INTRACAUDAL; PERINEURAL at 07:31

## 2022-01-03 RX ADMIN — ROCURONIUM BROMIDE 20 MG: 10 INJECTION, SOLUTION INTRAVENOUS at 07:51

## 2022-01-03 RX ADMIN — HYDROMORPHONE HYDROCHLORIDE 0.5 MG: 2 INJECTION, SOLUTION INTRAMUSCULAR; INTRAVENOUS; SUBCUTANEOUS at 14:39

## 2022-01-03 RX ADMIN — SODIUM CHLORIDE, SODIUM LACTATE, POTASSIUM CHLORIDE, AND CALCIUM CHLORIDE 125 ML/HR: 600; 310; 30; 20 INJECTION, SOLUTION INTRAVENOUS at 17:37

## 2022-01-03 RX ADMIN — HYDROMORPHONE HYDROCHLORIDE 0.4 MG: 2 INJECTION INTRAMUSCULAR; INTRAVENOUS; SUBCUTANEOUS at 07:58

## 2022-01-03 RX ADMIN — SODIUM CHLORIDE, PRESERVATIVE FREE 10 ML: 5 INJECTION INTRAVENOUS at 22:14

## 2022-01-03 RX ADMIN — HYDROMORPHONE HYDROCHLORIDE 0.2 MG: 2 INJECTION INTRAMUSCULAR; INTRAVENOUS; SUBCUTANEOUS at 09:05

## 2022-01-03 RX ADMIN — ONDANSETRON 4 MG: 2 INJECTION INTRAMUSCULAR; INTRAVENOUS at 07:55

## 2022-01-03 RX ADMIN — HYDROMORPHONE HYDROCHLORIDE 0.5 MG: 2 INJECTION INTRAMUSCULAR; INTRAVENOUS; SUBCUTANEOUS at 10:05

## 2022-01-03 RX ADMIN — KETAMINE HYDROCHLORIDE 50 MG: 50 INJECTION INTRAMUSCULAR; INTRAVENOUS at 07:31

## 2022-01-03 RX ADMIN — ROCURONIUM BROMIDE 10 MG: 10 INJECTION, SOLUTION INTRAVENOUS at 07:31

## 2022-01-03 RX ADMIN — HYDROMORPHONE HYDROCHLORIDE 1 MG: 1 INJECTION, SOLUTION INTRAMUSCULAR; INTRAVENOUS; SUBCUTANEOUS at 18:02

## 2022-01-03 RX ADMIN — LIDOCAINE HYDROCHLORIDE 1 MG/KG/HR: 8 INJECTION, SOLUTION INTRAVENOUS at 07:45

## 2022-01-03 RX ADMIN — SODIUM CHLORIDE, SODIUM LACTATE, POTASSIUM CHLORIDE, AND CALCIUM CHLORIDE: 600; 310; 30; 20 INJECTION, SOLUTION INTRAVENOUS at 08:33

## 2022-01-03 RX ADMIN — HYDROMORPHONE HYDROCHLORIDE 0.4 MG: 2 INJECTION INTRAMUSCULAR; INTRAVENOUS; SUBCUTANEOUS at 08:22

## 2022-01-03 RX ADMIN — HYDROMORPHONE HYDROCHLORIDE 1 MG: 1 INJECTION, SOLUTION INTRAMUSCULAR; INTRAVENOUS; SUBCUTANEOUS at 21:58

## 2022-01-03 RX ADMIN — SODIUM CHLORIDE, SODIUM LACTATE, POTASSIUM CHLORIDE, AND CALCIUM CHLORIDE 25 ML/HR: 600; 310; 30; 20 INJECTION, SOLUTION INTRAVENOUS at 05:54

## 2022-01-03 RX ADMIN — FENTANYL CITRATE 100 MCG: 50 INJECTION INTRAMUSCULAR; INTRAVENOUS at 07:31

## 2022-01-03 RX ADMIN — SODIUM CHLORIDE, PRESERVATIVE FREE 10 ML: 5 INJECTION INTRAVENOUS at 18:00

## 2022-01-03 RX ADMIN — MIDAZOLAM 2 MG: 1 INJECTION INTRAMUSCULAR; INTRAVENOUS at 07:20

## 2022-01-03 RX ADMIN — Medication 3 G: at 07:54

## 2022-01-03 RX ADMIN — DEXAMETHASONE SODIUM PHOSPHATE 10 MG: 4 INJECTION, SOLUTION INTRAMUSCULAR; INTRAVENOUS at 07:55

## 2022-01-03 RX ADMIN — METRONIDAZOLE 500 MG: 500 INJECTION, SOLUTION INTRAVENOUS at 06:21

## 2022-01-03 RX ADMIN — SERTRALINE 25 MG: 25 TABLET, FILM COATED ORAL at 21:58

## 2022-01-03 RX ADMIN — HYDROMORPHONE HYDROCHLORIDE 0.5 MG: 2 INJECTION, SOLUTION INTRAMUSCULAR; INTRAVENOUS; SUBCUTANEOUS at 09:30

## 2022-01-03 NOTE — ANESTHESIA POSTPROCEDURE EVALUATION
Procedure(s):  LAPAROTOMY EXPLORATORY, REMOVAL OF FOREIGN BODY.    general    Anesthesia Post Evaluation      Multimodal analgesia: multimodal analgesia used between 6 hours prior to anesthesia start to PACU discharge  Patient location during evaluation: bedside  Patient participation: complete - patient participated  Level of consciousness: awake and alert  Pain score: 3  Pain management: adequate  Airway patency: patent  Anesthetic complications: no  Cardiovascular status: acceptable  Respiratory status: acceptable  Hydration status: acceptable  Comments: Patient doing well. Continue care on floor. Post anesthesia nausea and vomiting:  none  Final Post Anesthesia Temperature Assessment:  Normothermia (36.0-37.5 degrees C)      INITIAL Post-op Vital signs:   Vitals Value Taken Time   /74 01/03/22 1011   Temp 36.4 °C (97.5 °F) 01/03/22 0952   Pulse 85 01/03/22 1020   Resp 14 01/03/22 1001   SpO2 95 % 01/03/22 1020   Vitals shown include unvalidated device data.

## 2022-01-03 NOTE — PERIOP NOTES
Lidocaine drip pump settings confirmed at Ideal body weight: 59.3 kg (130 lb 11.7 oz). Infusing at 7.4 ml/hour.

## 2022-01-03 NOTE — PERIOP NOTES
TRANSFER - OUT REPORT:    Verbal report given to 210 on Alcus Lopez  being transferred to 210 for routine post - op       Report consisted of patients Situation, Background, Assessment and   Recommendations(SBAR). Information from the following report(s) SBAR, Kardex, Procedure Summary and Intake/Output was reviewed with the receiving nurse. Lines:   Peripheral IV 01/03/22 Posterior;Right Hand (Active)   Site Assessment Clean, dry, & intact 01/03/22 0952   Phlebitis Assessment 0 01/03/22 0952   Infiltration Assessment 0 01/03/22 0952   Dressing Status Clean, dry, & intact 01/03/22 0952   Dressing Type Transparent;Tape 01/03/22 0952   Hub Color/Line Status Patent 01/03/22 0952   Alcohol Cap Used No 01/03/22 0554        Opportunity for questions and clarification was provided. Patient transported with:   Tech    VTE prophylaxis orders have been written for Christie Lopez. Patient and family given floor number and nurses name. Family updated re: pt status after security code verified.

## 2022-01-03 NOTE — OP NOTES
[facility]  OPERATIVE REPORT    Name:  Amber Maher  MR#:  [id number]  :  1980  ACCOUNT #:  [de-identified]  DATE OF SERVICE:  2022    PREOPERATIVE DIAGNOSIS:  Questionable small bowel intussusception with partial small bowel obstruction. POSTOPERATIVE DIAGNOSIS:  Gastric outlet obstruction, most likely from a previously placed rubber band around the anastomosis. PROCEDURE PERFORMED:  Exploratory laparotomy with lysis of adhesion with removal of foreign body (a rubber band around the gastrojejunostomy). SURGEON:  Hao Barrera MD    ANESTHESIA: general    COMPLICATIONS:  none    SPECIMENS REMOVED: as above    IMPLANTS: none    ESTIMATED BLOOD LOSS:  Minimum. INDICATION:  This is a 38-year-old female. She had a history of gastric bypass more than 10 years ago and recently she developed nausea, vomiting and this was evaluated by CT scan which showed a questionable small bowel intussusception in the proximal jejunum around her previous gastric bypass surgery and she has been refused by several bariatric surgeon to reoperate due to insurance issues and eventually she came to me. I offered her surgeries and she understood risks and benefits, agreed to proceed. FINDINGS:  1. She is morbidly obese. 2.  Her both gastrojejunostomy and enteroenterostomy appear intact and open. No evidence of intussusception, but there is a retention rubber band with Prolene suture within the band around gastrojejunostomy. This could explain her symptom and this was removed. PROCEDURE:  After informed consent obtained, the patient brought into the operating room, left in supine position. General anesthesia was administered. The patient's abdomen was prepped and draped in routine fashion. I took upper midline incision and extended to the level of umbilicus and peritoneal cavity was entered. She is indeed obese.   With a combination of handheld retractor and Bookwalter, we were able to expose her left abdomen and then the small intestine was run both distally and then proximally. Proximally, we were able to identify the Margarita limb and this was run all the way up to the stomach and this appeared to be antecolic Margarita limb. Right at the anastomosis, a rubber band was identified and this was confirmed around gastrojejunostomy anastomosis and then distally I was able to see the enteroenterostomy and this appeared to be wide open and there was no evidence of intussusception on my examination. Distally, the small bowel run to the level of the cecum and I see no evidence of bowel obstruction or any small bowel abnormality distally. So, eventually, I felt the rubber band is probably the trouble maker and then this was cut and removed and both the Prolene suture and the rubber band were removed together. Then, the surgical field inspected. No evidence of bleeding or bowel injury and then the midline fascia was closed with #1 looped PDS in running fashion. Skin closed with staples. The patient tolerated the procedure well, transferred to recovery room in stable condition. All the instrument count and lap count were correct.       Joyce Brown MD      [id number]  D:  01/03/2022 9:25  T:  01/03/2022 11:43  JOB #:  [id number]

## 2022-01-03 NOTE — ANESTHESIA PREPROCEDURE EVALUATION
Relevant Problems   HEMATOLOGY   (+) BRENDON (iron deficiency anemia)       Anesthetic History     PONV          Review of Systems / Medical History  Patient summary reviewed and pertinent labs reviewed    Pulmonary  Within defined limits                 Neuro/Psych         Psychiatric history (Depression)     Cardiovascular    Hypertension: well controlled              Exercise tolerance: >4 METS  Comments: Denies CP, SOB or changes in functional status   GI/Hepatic/Renal  Within defined limits              Endo/Other        Morbid obesity     Other Findings   Comments: Gastric bypass           Physical Exam    Airway  Mallampati: II  TM Distance: 4 - 6 cm  Neck ROM: normal range of motion   Mouth opening: Normal     Cardiovascular    Rhythm: regular  Rate: normal         Dental    Dentition: Caps/crowns     Pulmonary  Breath sounds clear to auscultation               Abdominal  GI exam deferred       Other Findings            Anesthetic Plan    ASA: 3  Anesthesia type: general          Induction: Intravenous  Anesthetic plan and risks discussed with: Patient and Family      Plan for Lidocaine and Ketamine. Discussed abdominal nerve blocks.

## 2022-01-03 NOTE — BRIEF OP NOTE
Brief Postoperative Note    Patient: Hamzah Mota  YOB: 1980  MRN: [id number]    Date of Procedure: 1/3/2022     Pre-Op Diagnosis: Intestinal obstruction, unspecified cause, unspecified whether partial or complete ([facility] [K56.609]    Post-Op Diagnosis: Same as preoperative diagnosis. Procedure(s):  LAPAROTOMY EXPLORATORY with OSMEL, REMOVAL OF FOREIGN BODY ( a rubber band around gastrojejunostomy)    Surgeon(s):   Ena Barr MD    Surgical Assistant: Surg Asst-1: Sasha Max    Anesthesia: General     Estimated Blood Loss (mL): Minimal    Complications: None    Specimens:   ID Type Source Tests Collected by Time Destination   1 : gastric band Fresh Abdomen  Ena Barr MD 1/3/2022 5388 Pathology        Implants: * No implants in log *    Drains:   Nasogastric Tube 01/03/22 (Active)       Findings: 1 morbid obesity; 2 both gastrojejunostomy an enteroenterostomy appears intact and open, no evidence of intussusception,  but there is retention rubber band with prolene suture within around gastrojejunostomy    Electronically Signed by Nila Welsh MD on 1/3/2022 at 9:13 AM

## 2022-01-04 PROBLEM — K31.1 GASTRIC OUTLET OBSTRUCTION: Status: ACTIVE | Noted: 2022-01-04

## 2022-01-04 LAB
ANION GAP SERPL CALC-SCNC: 6 MMOL/L (ref 7–16)
BASOPHILS # BLD: 0 K/UL (ref 0–0.2)
BASOPHILS NFR BLD: 0 % (ref 0–2)
BUN SERPL-MCNC: 7 MG/DL (ref 6–23)
CALCIUM SERPL-MCNC: 8.7 MG/DL (ref 8.3–10.4)
CHLORIDE SERPL-SCNC: 110 MMOL/L (ref 98–107)
CO2 SERPL-SCNC: 25 MMOL/L (ref 21–32)
CREAT SERPL-MCNC: 0.65 MG/DL (ref 0.6–1)
DIFFERENTIAL METHOD BLD: ABNORMAL
EOSINOPHIL # BLD: 0 K/UL (ref 0–0.8)
EOSINOPHIL NFR BLD: 0 % (ref 0.5–7.8)
ERYTHROCYTE [DISTWIDTH] IN BLOOD BY AUTOMATED COUNT: 14.7 % (ref 11.9–14.6)
GLUCOSE SERPL-MCNC: 119 MG/DL (ref 65–100)
HCT VFR BLD AUTO: 34.2 % (ref 35.8–46.3)
HGB BLD-MCNC: 10.9 G/DL (ref 11.7–15.4)
IMM GRANULOCYTES # BLD AUTO: 0 K/UL (ref 0–0.5)
IMM GRANULOCYTES NFR BLD AUTO: 1 % (ref 0–5)
LYMPHOCYTES # BLD: 0.7 K/UL (ref 0.5–4.6)
LYMPHOCYTES NFR BLD: 9 % (ref 13–44)
MCH RBC QN AUTO: 27 PG (ref 26.1–32.9)
MCHC RBC AUTO-ENTMCNC: 31.9 G/DL (ref 31.4–35)
MCV RBC AUTO: 84.9 FL (ref 79.6–97.8)
MONOCYTES # BLD: 0.4 K/UL (ref 0.1–1.3)
MONOCYTES NFR BLD: 6 % (ref 4–12)
NEUTS SEG # BLD: 6.7 K/UL (ref 1.7–8.2)
NEUTS SEG NFR BLD: 85 % (ref 43–78)
NRBC # BLD: 0 K/UL (ref 0–0.2)
PLATELET # BLD AUTO: 201 K/UL (ref 150–450)
PMV BLD AUTO: 11 FL (ref 9.4–12.3)
POTASSIUM SERPL-SCNC: 4 MMOL/L (ref 3.5–5.1)
RBC # BLD AUTO: 4.03 M/UL (ref 4.05–5.2)
SODIUM SERPL-SCNC: 141 MMOL/L (ref 136–145)
WBC # BLD AUTO: 7.8 K/UL (ref 4.3–11.1)

## 2022-01-04 PROCEDURE — 74011250637 HC RX REV CODE- 250/637: Performed by: SURGERY

## 2022-01-04 PROCEDURE — 85025 COMPLETE CBC W/AUTO DIFF WBC: CPT

## 2022-01-04 PROCEDURE — 74011250636 HC RX REV CODE- 250/636: Performed by: SURGERY

## 2022-01-04 PROCEDURE — 74011000250 HC RX REV CODE- 250: Performed by: SURGERY

## 2022-01-04 PROCEDURE — 65270000029 HC RM PRIVATE

## 2022-01-04 PROCEDURE — 80048 BASIC METABOLIC PNL TOTAL CA: CPT

## 2022-01-04 PROCEDURE — 36415 COLL VENOUS BLD VENIPUNCTURE: CPT

## 2022-01-04 RX ORDER — HYDROMORPHONE HYDROCHLORIDE 1 MG/ML
1 INJECTION, SOLUTION INTRAMUSCULAR; INTRAVENOUS; SUBCUTANEOUS
Status: DISCONTINUED | OUTPATIENT
Start: 2022-01-04 | End: 2022-01-06 | Stop reason: HOSPADM

## 2022-01-04 RX ORDER — HYDROCODONE BITARTRATE AND ACETAMINOPHEN 7.5; 325 MG/1; MG/1
1 TABLET ORAL
Status: DISCONTINUED | OUTPATIENT
Start: 2022-01-04 | End: 2022-01-06 | Stop reason: HOSPADM

## 2022-01-04 RX ORDER — NORETHINDRONE 5 MG/1
2.5 TABLET ORAL
Status: DISCONTINUED | OUTPATIENT
Start: 2022-01-04 | End: 2022-01-06 | Stop reason: HOSPADM

## 2022-01-04 RX ORDER — SODIUM CHLORIDE, SODIUM LACTATE, POTASSIUM CHLORIDE, CALCIUM CHLORIDE 600; 310; 30; 20 MG/100ML; MG/100ML; MG/100ML; MG/100ML
50 INJECTION, SOLUTION INTRAVENOUS CONTINUOUS
Status: DISCONTINUED | OUTPATIENT
Start: 2022-01-04 | End: 2022-01-04

## 2022-01-04 RX ADMIN — HYDROMORPHONE HYDROCHLORIDE 1 MG: 1 INJECTION, SOLUTION INTRAMUSCULAR; INTRAVENOUS; SUBCUTANEOUS at 09:14

## 2022-01-04 RX ADMIN — SERTRALINE 25 MG: 25 TABLET, FILM COATED ORAL at 19:51

## 2022-01-04 RX ADMIN — DOCUSATE SODIUM 100 MG: 100 CAPSULE ORAL at 09:12

## 2022-01-04 RX ADMIN — HYDROMORPHONE HYDROCHLORIDE 1 MG: 1 INJECTION, SOLUTION INTRAMUSCULAR; INTRAVENOUS; SUBCUTANEOUS at 04:54

## 2022-01-04 RX ADMIN — HYDROMORPHONE HYDROCHLORIDE 1 MG: 1 INJECTION, SOLUTION INTRAMUSCULAR; INTRAVENOUS; SUBCUTANEOUS at 19:47

## 2022-01-04 RX ADMIN — ENOXAPARIN SODIUM 40 MG: 100 INJECTION SUBCUTANEOUS at 19:51

## 2022-01-04 RX ADMIN — NORETHINDRONE 2.5 MG: 5 TABLET ORAL at 20:30

## 2022-01-04 RX ADMIN — SODIUM CHLORIDE, PRESERVATIVE FREE 10 ML: 5 INJECTION INTRAVENOUS at 21:30

## 2022-01-04 RX ADMIN — DOCUSATE SODIUM 100 MG: 100 CAPSULE ORAL at 18:40

## 2022-01-04 RX ADMIN — GABAPENTIN 300 MG: 300 CAPSULE ORAL at 16:19

## 2022-01-04 RX ADMIN — ENOXAPARIN SODIUM 40 MG: 100 INJECTION SUBCUTANEOUS at 09:13

## 2022-01-04 RX ADMIN — GABAPENTIN 300 MG: 300 CAPSULE ORAL at 09:13

## 2022-01-04 RX ADMIN — SODIUM CHLORIDE, PRESERVATIVE FREE 10 ML: 5 INJECTION INTRAVENOUS at 05:02

## 2022-01-04 RX ADMIN — SODIUM CHLORIDE, PRESERVATIVE FREE 10 ML: 5 INJECTION INTRAVENOUS at 15:10

## 2022-01-04 RX ADMIN — HYDROMORPHONE HYDROCHLORIDE 1 MG: 1 INJECTION, SOLUTION INTRAMUSCULAR; INTRAVENOUS; SUBCUTANEOUS at 14:47

## 2022-01-04 NOTE — PROGRESS NOTES
PLAN:  FLD  Lovenox  Neurontin  IVF - Decrease  Pain/ nausea control  OOB/ Chair  Possibly home 24-48 hours    ASSESSMENT:  Admit Date: 1/3/2022   1 Day Post-Op  Procedure(s):  LAPAROTOMY EXPLORATORY, REMOVAL OF FOREIGN BODY    Principal Problem:    Gastric outlet obstruction (1/4/2022)    Active Problems:    Obstruction of colon ([address] (1/3/2022)         SUBJECTIVE:  Pt up in room. Tolerating Clear Liquids. No nausea/ vomiting. Pain controlled. AF, NAD. OBJECTIVE:  Constitutional: Alert oriented cooperative patient in no acute distress; appears stated age   Visit Vitals  /81   Pulse 69   Temp 98.4 °F (36.9 °C)   Resp 18   Ht 5' 6\" (1.676 m)   Wt 323 lb 6.4 oz (146.7 kg)   SpO2 97%   BMI 52.20 kg/m²     Eyes:Sclera are clear. ENMT: no external lesions gross hearing normal; no obvious neck masses, no ear or lip lesions  CV: RRR. Normal perfusion  Resp: No JVD. Breathing is  non-labored; no audible wheezing. GI: Obese, soft and non-distended, appropriately ttp, dressing c/d/i     Musculoskeletal: unremarkable with normal function. No embolic signs or cyanosis.    Neuro:  Oriented; moves all 4; no focal deficits  Psychiatric: normal affect and mood, no memory impairment      Patient Vitals for the past 24 hrs:   BP Temp Pulse Resp SpO2   01/04/22 0707 125/81 98.4 °F (36.9 °C) 69 18 97 %   01/04/22 0429 134/80 98.2 °F (36.8 °C) 97 18 95 %   01/03/22 2300 (!) 150/81 98.2 °F (36.8 °C) 91 18 97 %   01/03/22 1857 130/78 98 °F (36.7 °C) 75 18 --   01/03/22 1730 (!) 138/92 97.6 °F (36.4 °C) 92 16 100 %   01/03/22 1631 -- -- 82 -- 98 %   01/03/22 1626 (!) 151/86 -- 76 14 98 %   01/03/22 1611 (!) 147/80 -- 75 15 97 %   01/03/22 1556 (!) 149/84 -- 81 14 98 %   01/03/22 1542 (!) 146/82 -- 78 15 97 %   01/03/22 1526 (!) 146/86 -- 72 14 97 %   01/03/22 1456 (!) 158/82 97.5 °F (36.4 °C) 80 12 97 %   01/03/22 1442 (!) 157/80 -- 85 14 97 %   01/03/22 1426 (!) 152/79 -- 69 15 96 %   01/03/22 1411 (!) 155/82 -- 78 14 97 %   01/03/22 1356 (!) 156/86 -- 76 14 95 %   01/03/22 1342 (!) 149/75 -- 73 12 97 %   01/03/22 1326 (!) 159/76 -- 84 14 98 %   01/03/22 1311 (!) 155/78 -- 78 12 98 %   01/03/22 1255 (!) 153/75 -- 100 16 97 %   01/03/22 1240 (!) 149/76 -- 84 16 97 %   01/03/22 1230 (!) 149/73 -- 93 16 97 %   01/03/22 1211 (!) 147/70 -- 83 -- 97 %   01/03/22 1156 (!) 142/69 -- 72 -- 97 %   01/03/22 1042 (!) 153/81 -- 87 15 95 %   01/03/22 1026 (!) 150/86 -- 86 14 95 %     Labs:    Recent Labs     01/04/22  0532   WBC 7.8   HGB 10.9*         K 4.0   *   CO2 25   BUN 7   CREA 0.65   *       Leigh Meyers, MILLY

## 2022-01-04 NOTE — PROGRESS NOTES
END OF SHIFT NOTE:    INTAKE/OUTPUT  01/03 0701 - 01/04 0700  In: 2559.5 [I.V.:2559.5]  Out: 2220 [Urine:1650]  Voiding: NO  Catheter: YES  Drain:              Flatus: Patient does not have flatus present. Stool:  0 occurrences. Characteristics:       Emesis: 0 occurrences. Characteristics:        VITAL SIGNS  Patient Vitals for the past 12 hrs:   Temp Pulse Resp BP SpO2   01/04/22 0429 98.2 °F (36.8 °C) 97 18 134/80 95 %   01/03/22 2300 98.2 °F (36.8 °C) 91 18 (!) 150/81 97 %   01/03/22 1857 98 °F (36.7 °C) 75 18 130/78 --       Pain Assessment  Pain Intensity 1: 7 (01/04/22 0454)  Pain Location 1: Abdomen  Pain Intervention(s) 1: Medication (see MAR)  Patient Stated Pain Goal: 4    Ambulating  No    Shift report to be given to oncoming nurse at the bedside.     [address]

## 2022-01-04 NOTE — PROGRESS NOTES
01/03/22 1750   Dual Skin Pressure Injury Assessment   Dual Skin Pressure Injury Assessment WDL   Second Care Provider (Based on [address] [place] [address]) VENKAT Marquez   Skin Integumentary   Skin Integumentary (WDL) X    Pressure  Injury Documentation No Pressure Injury Noted-Pressure Ulcer Prevention Initiated   Skin Color Appropriate for ethnicity   Skin Condition/Temp Warm;Dry   Skin Integrity Incision (comment)  (mid abd, dressing c/d/i)   Turgor Non-tenting   Wound Prevention and Protection Methods   Orientation of Wound Prevention Posterior   Location of Wound Prevention Sacrum/Coccyx   Dressing Present  No   Wound Offloading (Prevention Methods) Bed, pressure reduction mattress

## 2022-01-04 NOTE — PROGRESS NOTES
RNCM met with patient in room 210 to discuss discharge needs. Patient awake in bed. Alert and oriented x 4. Patient lives with spouse in two level home with sixteen total interior steps and none to enter home. Patient lives in [place], independent in all activities and mobility. 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 Patient denies use of home O2 and has not had HH previously. Patient confirmed demographic and emergency contact information. Patient needs PCP at discharge. Patient uses [address] on [employer]. CM will continue to follow for needs.        Care Management Interventions  PCP Verified by CM:  (needs PCP)  Mode of Transport at Discharge: Self  Transition of Care Consult (CM Consult): Discharge Planning  Support Systems: Spouse/Significant Other (Spouse: Rosemarie Cassidy [phone])  Confirm Follow Up Transport: Self  The Plan for Transition of Care is Related to the Following Treatment Goals : return to baseline  The Patient and/or Patient Representative was Provided with a Choice of Provider and Agrees with the Discharge Plan?: Yes  Freedom of Choice List was Provided with Basic Dialogue that Supports the Patient's Individualized Plan of Care/Goals, Treatment Preferences and Shares the Quality Data Associated with the Providers?: Yes   Resource Information Provided?: No  Discharge Location  Discharge Placement: Home with family assistance

## 2022-01-04 NOTE — PROGRESS NOTES
END OF SHIFT NOTE:    INTAKE/OUTPUT  No intake/output data recorded. Voiding: NO  Catheter: YES  Drain:              Flatus: Patient does not have flatus present. Stool:  0 occurrences. Characteristics:       Emesis: 0 occurrences.     Characteristics:        VITAL SIGNS  Patient Vitals for the past 12 hrs:   Temp Pulse Resp BP SpO2   01/03/22 1857 98 °F (36.7 °C) 75 18 130/78 --   01/03/22 1730 97.6 °F (36.4 °C) 92 16 (!) 138/92 100 %   01/03/22 1631 -- 82 -- -- 98 %   01/03/22 1626 -- 76 14 (!) 151/86 98 %   01/03/22 1611 -- 75 15 (!) 147/80 97 %   01/03/22 1556 -- 81 14 (!) 149/84 98 %   01/03/22 1542 -- 78 15 (!) 146/82 97 %   01/03/22 1526 -- 72 14 (!) 146/86 97 %   01/03/22 1456 97.5 °F (36.4 °C) 80 12 (!) 158/82 97 %   01/03/22 1442 -- 85 14 (!) 157/80 97 %   01/03/22 1426 -- 69 15 (!) 152/79 96 %   01/03/22 1411 -- 78 14 (!) 155/82 97 %   01/03/22 1356 -- 76 14 (!) 156/86 95 %   01/03/22 1342 -- 73 12 (!) 149/75 97 %   01/03/22 1326 -- 84 14 (!) 159/76 98 %   01/03/22 1311 -- 78 12 (!) 155/78 98 %   01/03/22 1255 -- 100 16 (!) 153/75 97 %   01/03/22 1240 -- 84 16 (!) 149/76 97 %   01/03/22 1230 -- 93 16 (!) 149/73 97 %   01/03/22 1211 -- 83 -- (!) 147/70 97 %   01/03/22 1156 -- 72 -- (!) 142/69 97 %   01/03/22 1042 -- 87 15 (!) 153/81 95 %   01/03/22 1026 -- 86 14 (!) 150/86 95 %   01/03/22 1011 -- 92 15 (!) 144/74 93 %   01/03/22 1006 -- 90 14 (!) 150/77 96 %   01/03/22 1001 -- 80 14 (!) 153/75 96 %   01/03/22 0957 -- 81 14 (!) 153/73 94 %   01/03/22 0952 97.5 °F (36.4 °C) 85 15 (!) 154/74 97 %   01/03/22 0947 -- 93 14 (!) 146/73 96 %   01/03/22 0937 -- 92 14 136/60 96 %   01/03/22 0931 -- 88 15 (!) 146/67 95 %   01/03/22 0926 -- 90 14 138/63 96 %   01/03/22 0925 -- -- 15 -- --   01/03/22 0916 -- (!) 101 14 (!) 155/85 100 %   01/03/22 0912 98.1 °F (36.7 °C) (!) 104 15 (!) 157/72 100 %   01/03/22 0910 -- (!) 103 -- (!) 157/72 100 %       Pain Assessment  Pain Intensity 1: 9 (01/03/22 1802)  Pain Location 1: Abdomen  Pain Intervention(s) 1: Medication (see MAR)  Patient Stated Pain Goal: 0    Ambulating  No    Shift report given to oncoming nurse at the bedside.     Evaristo Norris RN

## 2022-01-05 PROCEDURE — 2709999900 HC NON-CHARGEABLE SUPPLY

## 2022-01-05 PROCEDURE — 65270000029 HC RM PRIVATE

## 2022-01-05 PROCEDURE — 74011250636 HC RX REV CODE- 250/636: Performed by: SURGERY

## 2022-01-05 PROCEDURE — 74011000250 HC RX REV CODE- 250: Performed by: SURGERY

## 2022-01-05 PROCEDURE — 74011250637 HC RX REV CODE- 250/637: Performed by: SURGERY

## 2022-01-05 RX ORDER — CALCIUM CARBONATE 200(500)MG
400 TABLET,CHEWABLE ORAL
Status: DISCONTINUED | OUTPATIENT
Start: 2022-01-05 | End: 2022-01-06 | Stop reason: HOSPADM

## 2022-01-05 RX ORDER — DICYCLOMINE HYDROCHLORIDE 10 MG/1
20 CAPSULE ORAL AS NEEDED
Status: DISCONTINUED | OUTPATIENT
Start: 2022-01-05 | End: 2022-01-06 | Stop reason: HOSPADM

## 2022-01-05 RX ADMIN — HYDROMORPHONE HYDROCHLORIDE 1 MG: 1 INJECTION, SOLUTION INTRAMUSCULAR; INTRAVENOUS; SUBCUTANEOUS at 14:57

## 2022-01-05 RX ADMIN — HYDROMORPHONE HYDROCHLORIDE 1 MG: 1 INJECTION, SOLUTION INTRAMUSCULAR; INTRAVENOUS; SUBCUTANEOUS at 21:56

## 2022-01-05 RX ADMIN — SODIUM CHLORIDE, PRESERVATIVE FREE 10 ML: 5 INJECTION INTRAVENOUS at 05:40

## 2022-01-05 RX ADMIN — SODIUM CHLORIDE, PRESERVATIVE FREE 10 ML: 5 INJECTION INTRAVENOUS at 21:58

## 2022-01-05 RX ADMIN — NORETHINDRONE 2.5 MG: 5 TABLET ORAL at 22:00

## 2022-01-05 RX ADMIN — HYDROCODONE BITARTRATE AND ACETAMINOPHEN 1 TABLET: 7.5; 325 TABLET ORAL at 19:21

## 2022-01-05 RX ADMIN — HYDROMORPHONE HYDROCHLORIDE 1 MG: 1 INJECTION, SOLUTION INTRAMUSCULAR; INTRAVENOUS; SUBCUTANEOUS at 02:20

## 2022-01-05 RX ADMIN — CALCIUM CARBONATE (ANTACID) CHEW TAB 500 MG 400 MG: 500 CHEW TAB at 22:18

## 2022-01-05 RX ADMIN — DICYCLOMINE HYDROCHLORIDE 20 MG: 10 CAPSULE ORAL at 22:18

## 2022-01-05 RX ADMIN — HYDROMORPHONE HYDROCHLORIDE 1 MG: 1 INJECTION, SOLUTION INTRAMUSCULAR; INTRAVENOUS; SUBCUTANEOUS at 08:35

## 2022-01-05 RX ADMIN — ENOXAPARIN SODIUM 40 MG: 100 INJECTION SUBCUTANEOUS at 21:57

## 2022-01-05 RX ADMIN — DOCUSATE SODIUM 100 MG: 100 CAPSULE ORAL at 17:54

## 2022-01-05 RX ADMIN — ENOXAPARIN SODIUM 40 MG: 100 INJECTION SUBCUTANEOUS at 08:34

## 2022-01-05 RX ADMIN — DOCUSATE SODIUM 100 MG: 100 CAPSULE ORAL at 08:33

## 2022-01-05 RX ADMIN — SODIUM CHLORIDE, PRESERVATIVE FREE 10 ML: 5 INJECTION INTRAVENOUS at 14:54

## 2022-01-05 RX ADMIN — SERTRALINE 25 MG: 25 TABLET, FILM COATED ORAL at 21:57

## 2022-01-05 NOTE — PROGRESS NOTES
END OF SHIFT NOTE:    INTAKE/OUTPUT  01/04 0701 - 01/05 0700  In: 2485.6 [P.O.:690; I.V.:1795.6]  Out: 1850 [Urine:1850]  Voiding: YES  Catheter: NO  Drain:              Flatus: Patient does not have flatus present. Stool:  0 occurrences. Characteristics:       Emesis: 0 occurrences. Characteristics:        VITAL SIGNS  Patient Vitals for the past 12 hrs:   Temp Pulse Resp BP SpO2   01/05/22 0030 98.2 °F (36.8 °C) 71 18 138/77 96 %   01/04/22 1935 99.5 °F (37.5 °C) 80 18 139/82 97 %       Pain Assessment  Pain Intensity 1: 0 (01/05/22 0307)  Pain Location 1: Abdomen  Pain Intervention(s) 1: Medication (see MAR)  Patient Stated Pain Goal: 4    Ambulating  Yes    Shift report to be given to oncoming nurse at the bedside.     [facility]

## 2022-01-05 NOTE — PROGRESS NOTES
END OF SHIFT NOTE:    INTAKE/OUTPUT  01/03 0701 - 01/04 0700  In: 2559.5 [I.V.:2559.5]  Out: 1975 [Urine:1950]  Voiding: YES  Catheter: NO  Drain:              Flatus: Patient does not have flatus present. Stool:  0 occurrences. Characteristics:       Emesis: 0 occurrences. Characteristics:        VITAL SIGNS  Patient Vitals for the past 12 hrs:   Temp Pulse Resp BP SpO2   01/04/22 1935 99.5 °F (37.5 °C) 80 18 139/82 97 %   01/04/22 1550 98.9 °F (37.2 °C) 72 18 133/79 99 %   01/04/22 1111 98.6 °F (37 °C) 73 18 114/76 97 %       Pain Assessment  Pain Intensity 1: 0 (01/04/22 1545)  Pain Location 1: Abdomen  Pain Intervention(s) 1: Medication (see MAR)  Patient Stated Pain Goal: 4    Ambulating  Yes    Shift report given to oncoming nurse at the bedside.     Loni Woodard RN

## 2022-01-05 NOTE — PROGRESS NOTES
Patient concerned gabapentin caused a \"rash, redness, hives\" to the face, chest, and LUE. Patient refused evening dose to see if issue resolves. If so, please chart as an allergy.

## 2022-01-05 NOTE — PROGRESS NOTES
Pt developed similar symptoms after eating yesterday. AFVSS. Incision fine. I doubt this is mechanical issue based on my intraoperative finding. Will get UGI with SBFT.

## 2022-01-06 ENCOUNTER — APPOINTMENT (OUTPATIENT)
Dept: GENERAL RADIOLOGY | Age: 42
DRG: 336 | End: 2022-01-06
Attending: SURGERY
Payer: COMMERCIAL

## 2022-01-06 VITALS
SYSTOLIC BLOOD PRESSURE: 123 MMHG | DIASTOLIC BLOOD PRESSURE: 75 MMHG | RESPIRATION RATE: 18 BRPM | TEMPERATURE: 98.9 F | HEART RATE: 82 BPM | OXYGEN SATURATION: 95 % | HEIGHT: 66 IN | WEIGHT: 293 LBS | BODY MASS INDEX: 47.09 KG/M2

## 2022-01-06 PROCEDURE — 74011250637 HC RX REV CODE- 250/637: Performed by: SURGERY

## 2022-01-06 PROCEDURE — 74011000636 HC RX REV CODE- 636: Performed by: SURGERY

## 2022-01-06 PROCEDURE — 74246 X-RAY XM UPR GI TRC 2CNTRST: CPT

## 2022-01-06 PROCEDURE — 74011000250 HC RX REV CODE- 250: Performed by: SURGERY

## 2022-01-06 PROCEDURE — 74011250636 HC RX REV CODE- 250/636: Performed by: SURGERY

## 2022-01-06 RX ORDER — DOCUSATE SODIUM 100 MG/1
100 CAPSULE, LIQUID FILLED ORAL 2 TIMES DAILY
Qty: 60 CAPSULE | Refills: 2 | Status: SHIPPED | OUTPATIENT
Start: 2022-01-06 | End: 2022-04-06

## 2022-01-06 RX ORDER — HYDROCODONE BITARTRATE AND ACETAMINOPHEN 7.5; 325 MG/1; MG/1
1 TABLET ORAL
Qty: 20 TABLET | Refills: 0 | Status: SHIPPED | OUTPATIENT
Start: 2022-01-06 | End: 2022-01-11

## 2022-01-06 RX ADMIN — DOCUSATE SODIUM 100 MG: 100 CAPSULE ORAL at 13:41

## 2022-01-06 RX ADMIN — SODIUM CHLORIDE, PRESERVATIVE FREE 10 ML: 5 INJECTION INTRAVENOUS at 05:02

## 2022-01-06 RX ADMIN — HYDROMORPHONE HYDROCHLORIDE 1 MG: 1 INJECTION, SOLUTION INTRAMUSCULAR; INTRAVENOUS; SUBCUTANEOUS at 13:41

## 2022-01-06 RX ADMIN — HYDROMORPHONE HYDROCHLORIDE 1 MG: 1 INJECTION, SOLUTION INTRAMUSCULAR; INTRAVENOUS; SUBCUTANEOUS at 04:58

## 2022-01-06 RX ADMIN — DIATRIZOATE MEGLUMINE AND DIATRIZOATE SODIUM 120 ML: 660; 100 LIQUID ORAL; RECTAL at 09:45

## 2022-01-06 RX ADMIN — ENOXAPARIN SODIUM 40 MG: 100 INJECTION SUBCUTANEOUS at 13:42

## 2022-01-06 RX ADMIN — BARIUM SULFATE 600 ML: 240 SUSPENSION ORAL at 10:00

## 2022-01-06 RX ADMIN — SODIUM CHLORIDE, PRESERVATIVE FREE 10 ML: 5 INJECTION INTRAVENOUS at 13:42

## 2022-01-06 NOTE — DISCHARGE INSTRUCTIONS
Full liquid diet    Bentyl, norco, tylenol as needed    My office will refer her to Dr Jameson Vicente

## 2022-01-06 NOTE — PROGRESS NOTES
END OF SHIFT NOTE:    INTAKE/OUTPUT  01/05 0701 - 01/06 0700  In: 139 [P.O.:712; I.V.:94]  Out: 1100 [Urine:1100]  Voiding: YES  Catheter: NO  Drain:              Flatus: Patient does have flatus present. Stool:  0 occurrences. Characteristics:       Emesis: 0 occurrences. Characteristics:        VITAL SIGNS  Patient Vitals for the past 12 hrs:   Temp Pulse Resp BP SpO2   01/06/22 0428 98.5 °F (36.9 °C) 84 18 120/76 94 %   01/06/22 0003 98.4 °F (36.9 °C) 74 18 104/65 93 %   01/05/22 2040 99 °F (37.2 °C) 86 18 119/76 96 %       Pain Assessment  Pain Intensity 1: 8 (01/06/22 0458)  Pain Location 1: Abdomen  Pain Intervention(s) 1: Medication (see MAR)  Patient Stated Pain Goal: 4    Ambulating  Yes    Shift report to be given to oncoming nurse at the bedside.     [facility]

## 2022-01-06 NOTE — PROGRESS NOTES
PAtient expressed pains of cramping. Rash/itching/hives have resolved since last administration of med. Dr. Keara Nieto notified. New orders received for tums, d/c gabapentin, order Bentyl.

## 2022-01-06 NOTE — PROGRESS NOTES
Patient with discharge orders today. No supportive needs identified to CM. Patient's spouse at bedside to transport patient home. Patient has met all treatment goals and milestones. CM will continue to follow until discharged home today.       Care Management Interventions  PCP Verified by CM:  (needs PCP)  Mode of Transport at Discharge: Self  Transition of Care Consult (CM Consult): Discharge Planning  Support Systems: Spouse/Significant Other  Confirm Follow Up Transport: Self  The Plan for Transition of Care is Related to the Following Treatment Goals : return to baseline  The Patient and/or Patient Representative was Provided with a Choice of Provider and Agrees with the Discharge Plan?: Yes  Freedom of Choice List was Provided with Basic Dialogue that Supports the Patient's Individualized Plan of Care/Goals, Treatment Preferences and Shares the Quality Data Associated with the Providers?: Yes   Resource Information Provided?: No  Discharge Location  Discharge Placement: Home with family assistance

## 2022-01-06 NOTE — PROGRESS NOTES
PLAN:  FLD  Lovenox  Neurontin  Pain/ nausea control  OOB/ Chair  UGI - SBFT today    ASSESSMENT:  Admit Date: 1/3/2022   3 Day Post-Op  Procedure(s):  LAPAROTOMY EXPLORATORY, REMOVAL OF FOREIGN BODY    Principal Problem:    Gastric outlet obstruction (1/4/2022)    Active Problems:    Obstruction of colon ([address] (1/3/2022)         SUBJECTIVE:  Pt developed similar symptoms after eating. UGI - SBFT today. Pain controlled. AF, NAD. OBJECTIVE:  Constitutional: Alert oriented cooperative patient in no acute distress; appears stated age   Visit Vitals  /80   Pulse 76   Temp 98.7 °F (37.1 °C)   Resp 18   Ht 5' 6\" (1.676 m)   Wt 323 lb 6.4 oz (146.7 kg)   SpO2 96%   BMI 52.20 kg/m²     Eyes:Sclera are clear. ENMT: no external lesions gross hearing normal; no obvious neck masses, no ear or lip lesions  CV: RRR. Normal perfusion  Resp: No JVD. Breathing is  non-labored; no audible wheezing. GI: Obese, soft and non-distended, appropriately ttp, staples c/d/i     Musculoskeletal: unremarkable with normal function. No embolic signs or cyanosis.    Neuro:  Oriented; moves all 4; no focal deficits  Psychiatric: normal affect and mood, no memory impairment      Patient Vitals for the past 24 hrs:   BP Temp Pulse Resp SpO2   01/06/22 0704 120/80 98.7 °F (37.1 °C) 76 18 96 %   01/06/22 0428 120/76 98.5 °F (36.9 °C) 84 18 94 %   01/06/22 0003 104/65 98.4 °F (36.9 °C) 74 18 93 %   01/05/22 2040 119/76 99 °F (37.2 °C) 86 18 96 %   01/05/22 1444 (!) 143/89 99 °F (37.2 °C) 83 17 98 %   01/05/22 1159 117/78 98.2 °F (36.8 °C) 92 19 96 %     Labs:    Recent Labs     01/04/22  0532   WBC 7.8   HGB 10.9*         K 4.0   *   CO2 25   BUN 7   CREA 0.65   *       Conor Abhilash, NP

## 2022-01-06 NOTE — DISCHARGE SUMMARY
Discharge Summary     Patient ID:  Ramon Conde  [id number]   24 y.o.  1980    Admit date: 1/3/2022    Discharge Date: 1/6/2022      Admitting Physician: Amanda Evangelista MD     Discharge Physician: Amanda Evangelista MD    Admission Diagnoses: Intestinal obstruction, unspecified cause, unspecified whether partial or complete ([address] [K56.609]; Obstruction of colon ([address] [Z13.510]    Last Procedure: Procedure(s):  LAPAROTOMY EXPLORATORY, REMOVAL OF FOREIGN BODY    Discharge Diagnoses:   No evidence of bowel obstruction     Consults: None    Significant Diagnostic Studies: radiology: UGI with SBFT     Hospital Course:   Normal hospital course for this procedure. Pt had uneventful surgery. No intussusception was found, a gastric band was removed. However, pt still has trouble to eat solid food postoperatively, a UGI with small bowel follow through study is completely normal, no mechanical issues are identified. Pt tolerates liquid diet, she like to go home and follow with Dr Deejay Rios, who agrees to see her. Disposition: Home    Patient Instructions:   Current Discharge Medication List      START taking these medications    Details   HYDROcodone-acetaminophen (NORCO) 7.5-325 mg per tablet Take 1 Tablet by mouth every six (6) hours as needed for Pain for up to 5 days. Max Daily Amount: 4 Tablets. Qty: 20 Tablet, Refills: 0    Associated Diagnoses: S/P laparotomy      docusate sodium (COLACE) 100 mg capsule Take 1 Capsule by mouth two (2) times a day for 90 days. Qty: 60 Capsule, Refills: 2         CONTINUE these medications which have NOT CHANGED    Details   norethindrone acetate (AYGESTIN) 5 mg tablet Take 2.5 mg by mouth nightly. sertraline (ZOLOFT) 25 mg tablet Take 25 mg by mouth nightly. dicyclomine (BENTYL) 20 mg tablet Take 20 mg by mouth as needed. lidocaine HCl-hydrocortison ac topical cream Insert  into rectum as needed.       acetaminophen (Tylenol Extra Strength) 500 mg tablet Take 1,000 mg by mouth every six (6) hours as needed for Pain. Diet: full liquid. Activity: ad delfina. Follow-up Appointments   Procedures    FOLLOW UP VISIT Appointment in: Ten Days     Standing Status:   Standing     Number of Occurrences:   1     Order Specific Question:   Appointment in     Answer:   Ten Days        Total time discharging patient took greater than 30 minutes.     Signed:  Parveen Bell MD  January 6, 2022  1:30 PM

## 2022-01-07 RX ORDER — DICYCLOMINE HYDROCHLORIDE 10 MG/5ML
20 SOLUTION ORAL
Qty: 473 ML | Refills: 0 | Status: SHIPPED | OUTPATIENT
Start: 2022-01-07 | End: 2022-05-05

## 2022-05-05 ENCOUNTER — HOSPITAL ENCOUNTER (OUTPATIENT)
Dept: LAB | Age: 42
Discharge: HOME | End: 2022-05-05
Payer: COMMERCIAL

## 2022-05-05 DIAGNOSIS — D50.9 IRON DEFICIENCY ANEMIA, UNSPECIFIED IRON DEFICIENCY ANEMIA TYPE: ICD-10-CM

## 2022-05-05 LAB
ALBUMIN SERPL-MCNC: 3.7 G/DL (ref 3.5–5)
ALBUMIN/GLOB SERPL: 1.1 {RATIO} (ref 1.2–3.5)
ALP SERPL-CCNC: 99 U/L (ref 50–136)
ALT SERPL-CCNC: 28 U/L (ref 12–65)
ANION GAP SERPL CALC-SCNC: 7 MMOL/L (ref 7–16)
AST SERPL-CCNC: 19 U/L (ref 15–37)
BASOPHILS # BLD: 0 K/UL (ref 0–0.2)
BASOPHILS NFR BLD: 1 % (ref 0–2)
BILIRUB SERPL-MCNC: 0.3 MG/DL (ref 0.2–1.1)
BUN SERPL-MCNC: 9 MG/DL (ref 6–23)
CALCIUM SERPL-MCNC: 8.4 MG/DL (ref 8.3–10.4)
CHLORIDE SERPL-SCNC: 110 MMOL/L (ref 98–107)
CO2 SERPL-SCNC: 24 MMOL/L (ref 21–32)
CREAT SERPL-MCNC: 0.7 MG/DL (ref 0.6–1)
DIFFERENTIAL METHOD BLD: NORMAL
EOSINOPHIL # BLD: 0.1 K/UL (ref 0–0.8)
EOSINOPHIL NFR BLD: 3 % (ref 0.5–7.8)
ERYTHROCYTE [DISTWIDTH] IN BLOOD BY AUTOMATED COUNT: 14 % (ref 11.9–14.6)
FERRITIN SERPL-MCNC: 4 NG/ML (ref 8–388)
FOLATE SERPL-MCNC: 25.1 NG/ML (ref 3.1–17.5)
GLOBULIN SER CALC-MCNC: 3.5 G/DL (ref 2.3–3.5)
GLUCOSE SERPL-MCNC: 94 MG/DL (ref 65–100)
HCT VFR BLD AUTO: 37 % (ref 35.8–46.3)
HGB BLD-MCNC: 11.8 G/DL (ref 11.7–15.4)
HGB RETIC QN AUTO: 32 PG (ref 29–35)
IMM GRANULOCYTES # BLD AUTO: 0 K/UL (ref 0–0.5)
IMM GRANULOCYTES NFR BLD AUTO: 0 % (ref 0–5)
IMM RETICS NFR: 12.8 % (ref 3–15.9)
IRON SATN MFR SERPL: 11 %
IRON SERPL-MCNC: 51 UG/DL (ref 35–150)
LYMPHOCYTES # BLD: 1.2 K/UL (ref 0.5–4.6)
LYMPHOCYTES NFR BLD: 27 % (ref 13–44)
MCH RBC QN AUTO: 26.6 PG (ref 26.1–32.9)
MCHC RBC AUTO-ENTMCNC: 31.9 G/DL (ref 31.4–35)
MCV RBC AUTO: 83.5 FL (ref 79.6–97.8)
MONOCYTES # BLD: 0.3 K/UL (ref 0.1–1.3)
MONOCYTES NFR BLD: 6 % (ref 4–12)
NEUTS SEG # BLD: 3 K/UL (ref 1.7–8.2)
NEUTS SEG NFR BLD: 63 % (ref 43–78)
NRBC # BLD: 0 K/UL (ref 0–0.2)
PLATELET # BLD AUTO: 242 K/UL (ref 150–450)
PMV BLD AUTO: 10.7 FL (ref 9.4–12.3)
POTASSIUM SERPL-SCNC: 3.6 MMOL/L (ref 3.5–5.1)
PROT SERPL-MCNC: 7.2 G/DL (ref 6.3–8.2)
RBC # BLD AUTO: 4.43 M/UL (ref 4.05–5.2)
RETICS # AUTO: 0.05 M/UL (ref 0.03–0.1)
RETICS/RBC NFR AUTO: 1.1 % (ref 0.3–2)
SODIUM SERPL-SCNC: 141 MMOL/L (ref 136–145)
TIBC SERPL-MCNC: 479 UG/DL (ref 250–450)
VIT B12 SERPL-MCNC: 214 PG/ML (ref 193–986)
WBC # BLD AUTO: 4.7 K/UL (ref 4.3–11.1)

## 2022-05-05 PROCEDURE — 80053 COMPREHEN METABOLIC PANEL: CPT

## 2022-05-05 PROCEDURE — 83540 ASSAY OF IRON: CPT

## 2022-05-05 PROCEDURE — 82746 ASSAY OF FOLIC ACID SERUM: CPT

## 2022-05-05 PROCEDURE — 85046 RETICYTE/HGB CONCENTRATE: CPT

## 2022-05-05 PROCEDURE — 82607 VITAMIN B-12: CPT

## 2022-05-05 PROCEDURE — 82728 ASSAY OF FERRITIN: CPT

## 2022-05-05 PROCEDURE — 36415 COLL VENOUS BLD VENIPUNCTURE: CPT

## 2022-05-05 PROCEDURE — 85025 COMPLETE CBC W/AUTO DIFF WBC: CPT

## 2022-05-11 ENCOUNTER — HOSPITAL ENCOUNTER (OUTPATIENT)
Dept: INFUSION THERAPY | Age: 42
Discharge: HOME | End: 2022-05-11
Payer: COMMERCIAL

## 2022-05-11 VITALS
SYSTOLIC BLOOD PRESSURE: 151 MMHG | TEMPERATURE: 98.7 F | DIASTOLIC BLOOD PRESSURE: 110 MMHG | OXYGEN SATURATION: 100 % | HEART RATE: 94 BPM | RESPIRATION RATE: 16 BRPM

## 2022-05-11 DIAGNOSIS — D50.9 IRON DEFICIENCY ANEMIA, UNSPECIFIED IRON DEFICIENCY ANEMIA TYPE: Primary | ICD-10-CM

## 2022-05-11 PROCEDURE — 74011250636 HC RX REV CODE- 250/636: Performed by: INTERNAL MEDICINE

## 2022-05-11 PROCEDURE — 96365 THER/PROPH/DIAG IV INF INIT: CPT

## 2022-05-11 RX ORDER — SODIUM CHLORIDE 9 MG/ML
25 INJECTION, SOLUTION INTRAVENOUS CONTINUOUS
Status: ACTIVE | OUTPATIENT
Start: 2022-05-11 | End: 2022-05-11

## 2022-05-11 RX ADMIN — FERRIC CARBOXYMALTOSE INJECTION 750 MG: 50 INJECTION, SOLUTION INTRAVENOUS at 08:36

## 2022-05-11 RX ADMIN — SODIUM CHLORIDE 25 ML/HR: 9 INJECTION, SOLUTION INTRAVENOUS at 08:25

## 2022-05-11 NOTE — PROGRESS NOTES
Arrived to the [facility] ambulatory. injectafer completed. Patient tolerated well. Any issues or concerns during appointment: no.  Patient aware of next infusion appointment on  5/18 @ 0830  Patient instructed to call provider with temperature of 100.4 or greater or nausea/vomiting/ diarrhea or pain not controlled by medications  Discharged to home ambulatory.

## 2022-05-20 ENCOUNTER — HOSPITAL ENCOUNTER (OUTPATIENT)
Dept: INFUSION THERAPY | Age: 42
Discharge: HOME | End: 2022-05-20
Payer: COMMERCIAL

## 2022-05-20 VITALS
DIASTOLIC BLOOD PRESSURE: 87 MMHG | RESPIRATION RATE: 16 BRPM | HEART RATE: 86 BPM | TEMPERATURE: 98.8 F | OXYGEN SATURATION: 98 % | SYSTOLIC BLOOD PRESSURE: 131 MMHG

## 2022-05-20 DIAGNOSIS — D50.9 IRON DEFICIENCY ANEMIA, UNSPECIFIED IRON DEFICIENCY ANEMIA TYPE: Primary | ICD-10-CM

## 2022-05-20 PROCEDURE — 74011000250 HC RX REV CODE- 250: Performed by: INTERNAL MEDICINE

## 2022-05-20 PROCEDURE — 74011250636 HC RX REV CODE- 250/636: Performed by: INTERNAL MEDICINE

## 2022-05-20 PROCEDURE — 96365 THER/PROPH/DIAG IV INF INIT: CPT

## 2022-05-20 RX ORDER — SODIUM CHLORIDE 9 MG/ML
25 INJECTION, SOLUTION INTRAVENOUS CONTINUOUS
Status: ACTIVE | OUTPATIENT
Start: 2022-05-20 | End: 2022-05-20

## 2022-05-20 RX ORDER — SODIUM CHLORIDE 0.9 % (FLUSH) 0.9 %
10 SYRINGE (ML) INJECTION AS NEEDED
Status: ACTIVE | OUTPATIENT
Start: 2022-05-20 | End: 2022-05-20

## 2022-05-20 RX ADMIN — FERRIC CARBOXYMALTOSE INJECTION 750 MG: 50 INJECTION, SOLUTION INTRAVENOUS at 11:00

## 2022-05-20 RX ADMIN — SODIUM CHLORIDE, PRESERVATIVE FREE 10 ML: 5 INJECTION INTRAVENOUS at 10:40

## 2022-05-20 RX ADMIN — SODIUM CHLORIDE 25 ML/HR: 900 INJECTION, SOLUTION INTRAVENOUS at 10:41

## (undated) DEVICE — SYR 3ML LL TIP 1/10ML GRAD --

## (undated) DEVICE — REM POLYHESIVE ADULT PATIENT RETURN ELECTRODE: Brand: VALLEYLAB

## (undated) DEVICE — KENDALL RADIOLUCENT FOAM MONITORING ELECTRODE RECTANGULAR SHAPE: Brand: KENDALL

## (undated) DEVICE — SUTURE PDS + SZ 1 L96IN ABSRB VLT L65MM TP-1 1/2 CIR PDP880G

## (undated) DEVICE — CONTAINER PREFIL FRMLN 40ML --

## (undated) DEVICE — BLOCK BITE AD 60FR W/ VELC STRP ADDRESSES MOST PT AND

## (undated) DEVICE — SPONGE LAP 18X18IN STRL -- 5/PK

## (undated) DEVICE — BLADE ASSEMB CLP HAIR FINE --

## (undated) DEVICE — GOWN,SIRUS,NONRNF,SETINSLV,XL,20/CS: Brand: MEDLINE

## (undated) DEVICE — DRAPE TWL SURG 16X26IN BLU ORB04] ALLCARE INC]

## (undated) DEVICE — DEVICE SEAL L23CM NANO COAT MARYLAND JAW OPN DIV LIGASURE

## (undated) DEVICE — FORCEPS BX L240CM JAW DIA2.8MM L CAP W/ NDL MIC MESH TOOTH

## (undated) DEVICE — SYR 5ML 1/5 GRAD LL NSAF LF --

## (undated) DEVICE — SUTURE PERMAHAND SZ 3-0 L30IN NONABSORBABLE BLK L26MM SH C017D

## (undated) DEVICE — CARDINAL HEALTH FLEXIBLE LIGHT HANDLE COVER: Brand: CARDINAL HEALTH

## (undated) DEVICE — CANNULA NSL ORAL AD FOR CAPNOFLEX CO2 O2 AIRLFE

## (undated) DEVICE — BLADE ELECTRODE: Brand: EDGE

## (undated) DEVICE — CONNECTOR TBNG OD5-7MM O2 END DISP

## (undated) DEVICE — MAJOR GENERAL: Brand: MEDLINE INDUSTRIES, INC.

## (undated) DEVICE — Device

## (undated) DEVICE — TRAY,URINE METER,100% SILICONE,16FR10ML: Brand: MEDLINE

## (undated) DEVICE — DEVICE STBL AD TRICOT ANCHR PD FOR 3 W F CATH STATLOK

## (undated) DEVICE — SNARE POLYP SM W13MMXL240CM SHTH DIA2.4MM OVL FLX DISP

## (undated) DEVICE — GARMENT,MEDLINE,DVT,INT,CALF,MED, GEN2: Brand: MEDLINE

## (undated) DEVICE — PREP SKN CHLRAPRP APL 26ML STR --

## (undated) DEVICE — NDL PRT INJ NSAF BLNT 18GX1.5 --

## (undated) DEVICE — SOLUTION IV 1000ML 0.9% SOD CHL

## (undated) DEVICE — GAUZE,SPONGE,4"X4",16PLY,STRL,LF,10/TRAY: Brand: MEDLINE

## (undated) DEVICE — GOWN,REINF,POLY,ECL,PP SLV,XL: Brand: MEDLINE

## (undated) DEVICE — BUTTON SWITCH PENCIL BLADE ELECTRODE, HOLSTER: Brand: EDGE

## (undated) DEVICE — 2000CC GUARDIAN II: Brand: GUARDIAN

## (undated) DEVICE — GARMENT,MEDLINE,DVT,INT,CALF,LG, GEN2: Brand: MEDLINE